# Patient Record
Sex: FEMALE | Race: WHITE | Employment: UNEMPLOYED | URBAN - METROPOLITAN AREA
[De-identification: names, ages, dates, MRNs, and addresses within clinical notes are randomized per-mention and may not be internally consistent; named-entity substitution may affect disease eponyms.]

---

## 2018-05-18 ENCOUNTER — OFFICE VISIT (OUTPATIENT)
Dept: SURGERY | Age: 58
End: 2018-05-18
Payer: COMMERCIAL

## 2018-05-18 ENCOUNTER — OFFICE VISIT (OUTPATIENT)
Dept: SURGERY | Age: 58
End: 2018-05-18

## 2018-05-18 VITALS — HEIGHT: 67 IN | RESPIRATION RATE: 16 BRPM | WEIGHT: 164 LBS | BODY MASS INDEX: 25.74 KG/M2

## 2018-05-18 DIAGNOSIS — E88.1 LIPODYSTROPHY: Primary | ICD-10-CM

## 2018-05-18 PROCEDURE — MISCPNC PLASTICS VISIT NO CHARGE: Performed by: PLASTIC SURGERY

## 2018-05-18 PROCEDURE — 99204 OFFICE O/P NEW MOD 45 MIN: CPT | Performed by: PLASTIC SURGERY

## 2018-08-08 RX ORDER — M-VIT,TX,IRON,MINS/CALC/FOLIC 27MG-0.4MG
1 TABLET ORAL DAILY
COMMUNITY

## 2018-08-15 NOTE — H&P
[address] [facility]                    [address]                               HISTORY AND PHYSICAL    PATIENT NAME: Cheko Moser                     :        1960  MED REC NO:   [id number]                            ROOM:  ACCOUNT NO:   [de-identified]                           ADMIT DATE: 2018  PROVIDER:     Chelsea Prince MD    DATE OF SERVICE:  2018. CHIEF COMPLAINT:  \"I had polyps. \"    HISTORY OF PRESENT ILLNESS:  This is a 41-year-old female who presents with  complaint of two polyps removed in 2013 in the sigmoid area and presents  for high-risk screening. The patient denies any current episodes of  bleeding, narrowed stools, pencil-like stools, and no back pain. MEDICATIONS:  Include a multivitamin. ALLERGIES:  No known drug allergies. PAST SURGICAL HISTORY:  No previous surgeries. PAST MEDICAL HISTORY:  The patient denies any hypertension, diabetes, MI,  strokes, or cancers. SOCIAL HISTORY:  The patient does not smoke. She does not drink and she  does not use any recreational drugs. REVIEW OF SYSTEMS:  The patient denies any seasonal allergies, autoimmune  problem, any skin rashes, nausea, vomiting, fevers, or chills. She denies  any ear infection, sore throat, sinus problems, wheezing, coughing, or  shortness of breath. The patient denies any chest pain, heart murmurs,  blood pressure problems, or varicose veins. She denies any abdominal pain,  nausea, vomiting, or recent changes in her bowel function. She denies any  kidney stones, infections, or frequency or urination. PHYSICAL EXAMINATION:  VITAL SIGNS:  Stable. Afebrile. GENERAL APPEARANCE:  A well-developed, well-nourished white female, in no  acute distress. HEENT:  Head is normocephalic and atraumatic. NECK:  Supple. No bruits. No adenopathy. LUNGS:  Clear to auscultation. CARDIOVASCULAR:  Regular rate and rhythm.   ABDOMEN:

## 2018-08-22 ENCOUNTER — ANESTHESIA (OUTPATIENT)
Dept: ENDOSCOPY | Age: 58
End: 2018-08-22
Payer: COMMERCIAL

## 2018-08-22 ENCOUNTER — HOSPITAL ENCOUNTER (OUTPATIENT)
Age: 58
Setting detail: OUTPATIENT SURGERY
Discharge: HOME | End: 2018-08-22
Attending: SURGERY | Admitting: SURGERY
Payer: COMMERCIAL

## 2018-08-22 ENCOUNTER — ANESTHESIA EVENT (OUTPATIENT)
Dept: ENDOSCOPY | Age: 58
End: 2018-08-22
Payer: COMMERCIAL

## 2018-08-22 VITALS
WEIGHT: 165 LBS | HEIGHT: 67 IN | SYSTOLIC BLOOD PRESSURE: 115 MMHG | TEMPERATURE: 97.8 F | RESPIRATION RATE: 16 BRPM | HEART RATE: 63 BPM | DIASTOLIC BLOOD PRESSURE: 64 MMHG | OXYGEN SATURATION: 96 % | BODY MASS INDEX: 25.9 KG/M2

## 2018-08-22 VITALS — OXYGEN SATURATION: 95 % | DIASTOLIC BLOOD PRESSURE: 60 MMHG | SYSTOLIC BLOOD PRESSURE: 122 MMHG

## 2018-08-22 PROCEDURE — 2580000003 HC RX 258: Performed by: NURSE ANESTHETIST, CERTIFIED REGISTERED

## 2018-08-22 PROCEDURE — 7100000011 HC PHASE II RECOVERY - ADDTL 15 MIN: Performed by: SURGERY

## 2018-08-22 PROCEDURE — 2580000003 HC RX 258: Performed by: SURGERY

## 2018-08-22 PROCEDURE — 3609010600 HC COLONOSCOPY POLYPECTOMY SNARE/COLD BIOPSY: Performed by: SURGERY

## 2018-08-22 PROCEDURE — 88305 TISSUE EXAM BY PATHOLOGIST: CPT

## 2018-08-22 PROCEDURE — 3700000001 HC ADD 15 MINUTES (ANESTHESIA): Performed by: SURGERY

## 2018-08-22 PROCEDURE — 2709999900 HC NON-CHARGEABLE SUPPLY: Performed by: SURGERY

## 2018-08-22 PROCEDURE — C1773 RET DEV, INSERTABLE: HCPCS | Performed by: SURGERY

## 2018-08-22 PROCEDURE — 6360000002 HC RX W HCPCS: Performed by: NURSE ANESTHETIST, CERTIFIED REGISTERED

## 2018-08-22 PROCEDURE — 7100000010 HC PHASE II RECOVERY - FIRST 15 MIN: Performed by: SURGERY

## 2018-08-22 PROCEDURE — 3700000000 HC ANESTHESIA ATTENDED CARE: Performed by: SURGERY

## 2018-08-22 RX ORDER — PROPOFOL 10 MG/ML
INJECTION, EMULSION INTRAVENOUS PRN
Status: DISCONTINUED | OUTPATIENT
Start: 2018-08-22 | End: 2018-08-22 | Stop reason: SDUPTHER

## 2018-08-22 RX ORDER — MIDAZOLAM HYDROCHLORIDE 1 MG/ML
INJECTION INTRAMUSCULAR; INTRAVENOUS PRN
Status: DISCONTINUED | OUTPATIENT
Start: 2018-08-22 | End: 2018-08-22 | Stop reason: SDUPTHER

## 2018-08-22 RX ORDER — SODIUM CHLORIDE 9 MG/ML
INJECTION, SOLUTION INTRAVENOUS CONTINUOUS
Status: DISCONTINUED | OUTPATIENT
Start: 2018-08-22 | End: 2018-08-22 | Stop reason: HOSPADM

## 2018-08-22 RX ORDER — FENTANYL CITRATE 50 UG/ML
INJECTION, SOLUTION INTRAMUSCULAR; INTRAVENOUS PRN
Status: DISCONTINUED | OUTPATIENT
Start: 2018-08-22 | End: 2018-08-22 | Stop reason: SDUPTHER

## 2018-08-22 RX ORDER — 0.9 % SODIUM CHLORIDE 0.9 %
10 VIAL (ML) INJECTION EVERY 12 HOURS SCHEDULED
Status: DISCONTINUED | OUTPATIENT
Start: 2018-08-22 | End: 2018-08-22 | Stop reason: HOSPADM

## 2018-08-22 RX ORDER — SODIUM CHLORIDE 9 MG/ML
INJECTION, SOLUTION INTRAVENOUS CONTINUOUS PRN
Status: DISCONTINUED | OUTPATIENT
Start: 2018-08-22 | End: 2018-08-22 | Stop reason: SDUPTHER

## 2018-08-22 RX ORDER — 0.9 % SODIUM CHLORIDE 0.9 %
10 VIAL (ML) INJECTION PRN
Status: DISCONTINUED | OUTPATIENT
Start: 2018-08-22 | End: 2018-08-22 | Stop reason: HOSPADM

## 2018-08-22 RX ADMIN — FENTANYL CITRATE 50 MCG: 50 INJECTION, SOLUTION INTRAMUSCULAR; INTRAVENOUS at 07:15

## 2018-08-22 RX ADMIN — MIDAZOLAM HYDROCHLORIDE 2 MG: 1 INJECTION, SOLUTION INTRAMUSCULAR; INTRAVENOUS at 07:13

## 2018-08-22 RX ADMIN — SODIUM CHLORIDE: 9 INJECTION, SOLUTION INTRAVENOUS at 07:12

## 2018-08-22 RX ADMIN — SODIUM CHLORIDE: 9 INJECTION, SOLUTION INTRAVENOUS at 07:11

## 2018-08-22 RX ADMIN — PROPOFOL 180 MG: 10 INJECTION, EMULSION INTRAVENOUS at 07:13

## 2018-08-22 RX ADMIN — FENTANYL CITRATE 50 MCG: 50 INJECTION, SOLUTION INTRAMUSCULAR; INTRAVENOUS at 07:13

## 2018-08-22 ASSESSMENT — PAIN SCALES - GENERAL
PAINLEVEL_OUTOF10: 0

## 2018-08-22 ASSESSMENT — PAIN - FUNCTIONAL ASSESSMENT: PAIN_FUNCTIONAL_ASSESSMENT: 0-10

## 2018-08-22 NOTE — ANESTHESIA PRE PROCEDURE
BP Readings from Last 3 Encounters:   08/22/18 122/71   12/28/16 121/70   03/17/16 122/82       NPO Status: Time of last liquid consumption: 2300                        Time of last solid consumption: 0830                        Date of last liquid consumption: 08/21/18                        Date of last solid food consumption: 08/21/18    BMI:   Wt Readings from Last 3 Encounters:   08/22/18 165 lb (74.8 kg)   05/18/18 164 lb (74.4 kg)   08/13/13 165 lb (74.8 kg)     Body mass index is 26.23 kg/m². CBC: No results found for: WBC, RBC, HGB, HCT, MCV, RDW, PLT    CMP: No results found for: NA, K, CL, CO2, BUN, CREATININE, GFRAA, AGRATIO, LABGLOM, GLUCOSE, PROT, CALCIUM, BILITOT, ALKPHOS, AST, ALT    POC Tests: No results for input(s): POCGLU, POCNA, POCK, POCCL, POCBUN, POCHEMO, POCHCT in the last 72 hours. Coags: No results found for: PROTIME, INR, APTT    HCG (If Applicable): No results found for: PREGTESTUR, PREGSERUM, HCG, HCGQUANT     ABGs: No results found for: PHART, PO2ART, WCM9OUA, JDY8HQJ, BEART, N0DMUNII     Type & Screen (If Applicable):  No results found for: LABABO, [address]    Anesthesia Evaluation  Patient summary reviewed  Airway: Mallampati: II  TM distance: >3 FB   Neck ROM: full  Mouth opening: > = 3 FB Dental: normal exam         Pulmonary:Negative Pulmonary ROS and normal exam                               Cardiovascular:Negative CV ROS                      Neuro/Psych:   Negative Neuro/Psych ROS              GI/Hepatic/Renal: Neg GI/Hepatic/Renal ROS            Endo/Other: Negative Endo/Other ROS                    Abdominal:           Vascular:                                        Anesthesia Plan      MAC     ASA 2       Induction: intravenous. Anesthetic plan and risks discussed with patient. Plan discussed with CRNA.                   Ryan Youngblood MD   8/22/2018

## 2018-08-22 NOTE — PROGRESS NOTES
DATE OF PROCEDURE: 8/22/2018    SURGEON: Dr. Silvino Kennedy M.D.     [facility]    PREOPERATIVE DIAGNOSES:  high risk screening, history of colon polyps; Last colonoscopy 2013*    POSTOPERATIVE DIAGNOSES: 3 polyps     OPERATION: Colonoscopy with 1 loop snare polypectomy and 2 cauterization polypectomies  ANESTHESIA: LMAC  COMPLICATIONS: None.    PLEASE SEE DICTATION      Branden Arteaga 8/22/2018 7:31 AM
surgery we would greatly appreciate your comments    [] Parent/guardian of a minor must accompany their child and remain on the premises  the entire time they are under our care     [] Pediatric patients may bring favorite toy, blanket or comfort item with them    [] A caregiver or family member must remain with the patient during their stay if they are mentally handicapped, have dementia, disoriented or unable to use a call light or would be a safety concern if left unattended    [x] Please notify surgeon if you develop any illness between now and time of surgery (cold, cough, sore throat, fever, nausea, vomiting) or any signs of infections  including skin, wounds, and dental.    [x] Other instructions    EDUCATIONAL MATERIALS PROVIDED:    [] PAT Preoperative Education Packet/Booklet     [] Medication List    [] Fluoroscopy Information Pamphlet    [] Transfusion bracelet applied with instructions    [] Joint replacement video reviewed    [] Shower with antibacterial soap and use CHG wipes provided the evening before surgery as instructed

## 2018-08-23 NOTE — OP NOTE
[address] [address]                                 OPERATIVE REPORT    PATIENT NAME: Phan Jacobson                     :        1960  MED REC NO:   [id number]                            ROOM:  ACCOUNT NO:   [de-identified]                           ADMIT DATE: 2018  PROVIDER:     Titus Wharton MD    DATE OF PROCEDURE:  2018    PREOPERATIVE DIAGNOSIS:  High-risk screening, history of colon polyps, last  colonoscopy in . POSTOPERATIVE DIAGNOSIS:  Three colon polyps. PROCEDURES:  Colonoscopy with loop snare cauterization polypectomy x1 in  the sigmoid colon and cauterization polypectomy x2 in the rectosigmoid  area. SURGEON:  Titus Wharton MD    ANESTHESIA:  LMAC. COMPLICATIONS:  None. ESTIMATED BLOOD LOSS:  None. FINDINGS:  Normal ileocecal valve and appendiceal orifice. Prep adequate. Withdrawal time six minutes. Three polyps identified and removed as stated  above. No gross evidence of colitis, tumors, masses, or malignancy. INDICATION:  High-risk screening, history of colon polyps. PROCEDURE:  The patient was brought into the endoscopy suite. An IV was  established. Permit was obtained. Informed consent was obtained. The  patient was placed in the left lateral Espinoza position and digital rectal  examination was performed. The adult video Olympus colonoscope was then inserted into the rectum which  was insufflated with air. The scope then traversed through the sigmoid,  descending, transverse, and ascending colon, all the way to a  normal-appearing ileocecal valve. A 2nd look at the mucosa was performed  as the scope was being withdrawn. Photographs were taken. The above  findings were identified. In the area of the rectum, the scope was  retroflexed.     With the air and scope completely withdrawn, the patient tolerated the  procedure well, and was transferred to the recovery room in stable  condition with stable vital signs. The findings were reviewed with the  patient and family at that time. Loop snare cauterization as well as  cauterization polypectomy performed as stated above. Deferred to  Pathology. Followup arrangements were made.         Cami Adames MD    D: 08/22/2018 7:39:31       T: 08/22/2018 11:46:51     [email or web address]  Job#: [id number]     Doc#: [id number]    CC:  Tala Jaramillo MD

## 2018-09-26 ENCOUNTER — OFFICE VISIT (OUTPATIENT)
Dept: SURGERY | Age: 58
End: 2018-09-26

## 2018-09-26 VITALS
OXYGEN SATURATION: 98 % | HEIGHT: 66 IN | DIASTOLIC BLOOD PRESSURE: 70 MMHG | BODY MASS INDEX: 26.52 KG/M2 | WEIGHT: 165 LBS | HEART RATE: 82 BPM | SYSTOLIC BLOOD PRESSURE: 112 MMHG

## 2018-09-26 DIAGNOSIS — R23.8 FACIAL AGING: Primary | ICD-10-CM

## 2018-09-26 PROCEDURE — DM00300 PR BELOTERO: Performed by: PLASTIC SURGERY

## 2018-09-26 PROCEDURE — MISCJVOL1 PR OFFICE/OUTPT VISIT,PROCEDURE ONLY: Performed by: PLASTIC SURGERY

## 2018-09-26 PROCEDURE — DM00130 PR DERM ONLY BOTOX INJ LEVEL 4: Performed by: PLASTIC SURGERY

## 2018-09-26 NOTE — PROGRESS NOTES
Botulinum Toxin Injection Procedure Note:      The risks, benefits and options were discussed with the pt. The risks included but not limited to pain, bleeding, infection, asymmetry,  and need for further procedures. The patient understands that there is a risk for upper eyelid ptosis. There may be a need for touch up injections and follow up at 2 weeks is encouraged. All of Her questions were answered to Her satisfaction and She agrees to proceed with the operation. The forehead, glabella and bilateral crows feet area was cleansed with alcohol. Ice was used to numb the area. The different FDA approved brands of botulinum toxin A were discussed with the patient, Botox was agreed upon and reconstituted in a concentration of 1 unit/ 0.01cc with sterile injectable saline. 36 units were injected into the areas. There was pinpoint bleeding and local redness. The patient tolerated the procedure well. The patient was counseled to use ice for the next hour and limit strenuous activity for 24 hours. Injectable Filler Procedure Note:      Patient states she had a 25 pound weight loss recently and noticed a significant change in the appearance of her midface. She states that her lower eyelid are protruding more fat and the cheeks are much smaller. She desires filling of the midface with Voluma as she's had in the past.  She states she did have filling in this area recently by another practitioner and thinks they used Restylane. Following the injection of Voluma, she states that she still wanted reduction in the tear trough deformity. I did discuss lower eyelid surgery with her to reduce the fat herniation or attempting Belotero injections into the tear trough as an off label use. The patient was cautioned that this can cause significant bruising and contour irregularities and that she would likely need a touchup procedure in a week.   She states she definitely would like to proceed and is willing to undertake the

## 2018-10-03 ENCOUNTER — OFFICE VISIT (OUTPATIENT)
Dept: SURGERY | Age: 58
End: 2018-10-03

## 2018-10-03 VITALS — BODY MASS INDEX: 26.52 KG/M2 | HEIGHT: 66 IN | WEIGHT: 165 LBS

## 2018-10-03 DIAGNOSIS — R23.8 FACIAL AGING: Primary | ICD-10-CM

## 2018-10-03 PROCEDURE — MISCPNC PLASTICS VISIT NO CHARGE: Performed by: PLASTIC SURGERY

## 2018-10-08 ENCOUNTER — TELEPHONE (OUTPATIENT)
Dept: SURGERY | Age: 58
End: 2018-10-08

## 2018-12-27 ENCOUNTER — OFFICE VISIT (OUTPATIENT)
Dept: SURGERY | Age: 58
End: 2018-12-27

## 2018-12-27 VITALS
WEIGHT: 165 LBS | BODY MASS INDEX: 26.52 KG/M2 | SYSTOLIC BLOOD PRESSURE: 120 MMHG | HEIGHT: 66 IN | DIASTOLIC BLOOD PRESSURE: 68 MMHG

## 2018-12-27 DIAGNOSIS — L98.9 BENIGN SKIN LESION: ICD-10-CM

## 2018-12-27 DIAGNOSIS — G89.18 POST-OP PAIN: Primary | ICD-10-CM

## 2018-12-27 PROCEDURE — MISCLL13 PR OFFICE/OUTPT VISIT,PROCEDURE ONLY: Performed by: PLASTIC SURGERY

## 2018-12-27 RX ORDER — HYDROCODONE BITARTRATE AND ACETAMINOPHEN 5; 325 MG/1; MG/1
1 TABLET ORAL EVERY 6 HOURS PRN
Qty: 10 TABLET | Refills: 0 | Status: SHIPPED | OUTPATIENT
Start: 2018-12-27 | End: 2019-01-03

## 2018-12-27 RX ORDER — LIDOCAINE HYDROCHLORIDE AND EPINEPHRINE 10; 10 MG/ML; UG/ML
3 INJECTION, SOLUTION INFILTRATION; PERINEURAL ONCE
Status: COMPLETED | OUTPATIENT
Start: 2018-12-27 | End: 2018-12-27

## 2018-12-27 RX ORDER — CLINDAMYCIN HYDROCHLORIDE 300 MG/1
300 CAPSULE ORAL 3 TIMES DAILY
Qty: 15 CAPSULE | Refills: 0 | Status: SHIPPED | OUTPATIENT
Start: 2018-12-27 | End: 2019-01-01

## 2018-12-27 RX ADMIN — LIDOCAINE HYDROCHLORIDE AND EPINEPHRINE 3 ML: 10; 10 INJECTION, SOLUTION INFILTRATION; PERINEURAL at 13:40

## 2019-01-18 ENCOUNTER — OFFICE VISIT (OUTPATIENT)
Dept: SURGERY | Age: 59
End: 2019-01-18

## 2019-01-18 VITALS — WEIGHT: 165 LBS | RESPIRATION RATE: 16 BRPM | BODY MASS INDEX: 25.9 KG/M2 | HEIGHT: 67 IN

## 2019-01-18 DIAGNOSIS — R23.8 FACIAL AGING: ICD-10-CM

## 2019-01-18 DIAGNOSIS — E88.1 LIPODYSTROPHY: Primary | ICD-10-CM

## 2019-01-18 PROCEDURE — 99024 POSTOP FOLLOW-UP VISIT: CPT | Performed by: PLASTIC SURGERY

## 2019-01-18 PROCEDURE — DM00130 PR OFFICE/OUTPT VISIT,PROCEDURE ONLY: Performed by: PLASTIC SURGERY

## 2019-02-14 ENCOUNTER — TELEPHONE (OUTPATIENT)
Dept: SURGERY | Age: 59
End: 2019-02-14

## 2019-03-08 ENCOUNTER — PROCEDURE VISIT (OUTPATIENT)
Dept: AUDIOLOGY | Age: 59
End: 2019-03-08
Payer: COMMERCIAL

## 2019-03-08 ENCOUNTER — OFFICE VISIT (OUTPATIENT)
Dept: ENT CLINIC | Age: 59
End: 2019-03-08
Payer: COMMERCIAL

## 2019-03-08 VITALS
HEIGHT: 67 IN | WEIGHT: 165 LBS | SYSTOLIC BLOOD PRESSURE: 113 MMHG | DIASTOLIC BLOOD PRESSURE: 62 MMHG | HEART RATE: 72 BPM | BODY MASS INDEX: 25.9 KG/M2 | OXYGEN SATURATION: 97 %

## 2019-03-08 DIAGNOSIS — J30.9 ALLERGIC RHINITIS, UNSPECIFIED SEASONALITY, UNSPECIFIED TRIGGER: ICD-10-CM

## 2019-03-08 DIAGNOSIS — H93.13 TINNITUS OF BOTH EARS: Primary | ICD-10-CM

## 2019-03-08 PROCEDURE — 3017F COLORECTAL CA SCREEN DOC REV: CPT | Performed by: OTOLARYNGOLOGY

## 2019-03-08 PROCEDURE — 92567 TYMPANOMETRY: CPT | Performed by: AUDIOLOGIST

## 2019-03-08 PROCEDURE — G8484 FLU IMMUNIZE NO ADMIN: HCPCS | Performed by: OTOLARYNGOLOGY

## 2019-03-08 PROCEDURE — G8419 CALC BMI OUT NRM PARAM NOF/U: HCPCS | Performed by: OTOLARYNGOLOGY

## 2019-03-08 PROCEDURE — 92557 COMPREHENSIVE HEARING TEST: CPT | Performed by: AUDIOLOGIST

## 2019-03-08 PROCEDURE — 1036F TOBACCO NON-USER: CPT | Performed by: OTOLARYNGOLOGY

## 2019-03-08 PROCEDURE — G8427 DOCREV CUR MEDS BY ELIG CLIN: HCPCS | Performed by: OTOLARYNGOLOGY

## 2019-03-08 PROCEDURE — 99204 OFFICE O/P NEW MOD 45 MIN: CPT | Performed by: OTOLARYNGOLOGY

## 2019-03-08 RX ORDER — FLUTICASONE PROPIONATE 50 MCG
2 SPRAY, SUSPENSION (ML) NASAL DAILY
Qty: 1 BOTTLE | Refills: 3 | Status: SHIPPED | OUTPATIENT
Start: 2019-03-08 | End: 2019-08-09 | Stop reason: SDUPTHER

## 2019-03-08 ASSESSMENT — ENCOUNTER SYMPTOMS
ABDOMINAL PAIN: 0
SINUS PRESSURE: 0
NAUSEA: 0
SHORTNESS OF BREATH: 0
EYE DISCHARGE: 0
EYE PAIN: 0
STRIDOR: 0
RHINORRHEA: 0
VOICE CHANGE: 0

## 2019-07-10 ENCOUNTER — OFFICE VISIT (OUTPATIENT)
Dept: SURGERY | Age: 59
End: 2019-07-10

## 2019-07-10 VITALS
DIASTOLIC BLOOD PRESSURE: 70 MMHG | WEIGHT: 164 LBS | RESPIRATION RATE: 19 BRPM | BODY MASS INDEX: 25.74 KG/M2 | HEIGHT: 67 IN | TEMPERATURE: 97.9 F | OXYGEN SATURATION: 97 % | SYSTOLIC BLOOD PRESSURE: 110 MMHG | HEART RATE: 74 BPM

## 2019-07-10 DIAGNOSIS — R23.8 FACIAL AGING: Primary | ICD-10-CM

## 2019-07-10 PROCEDURE — DM00130 PR DERM ONLY BOTOX INJ LEVEL 4: Performed by: PLASTIC SURGERY

## 2019-07-10 NOTE — PROGRESS NOTES
Botulinum Toxin Injection Procedure Note:      The risks, benefits and options were discussed with the pt. The risks included but not limited to pain, bleeding, infection, asymmetry,  and need for further procedures. The patient understands that there is a risk for upper eyelid ptosis. There may be a need for touch up injections and follow up at 2 weeks is encouraged. All of Her questions were answered to Her satisfaction and She agrees to proceed with the operation. The forehead, glabella and bilateral crows feet area was cleansed with alcohol. Ice was used to numb the area. The different FDA approved brands of botulinum toxin A were discussed with the patient, Botox was agreed upon and reconstituted in a concentration of 1 unit/ 0.01cc with sterile injectable saline. 36 units were injected into the areas. There was pinpoint bleeding and local redness. The patient tolerated the procedure well. The patient was counseled to use ice for the next hour and limit strenuous activity for 24 hours. Follow up in 2 weeks or sooner with any concerns. I attest that the patient was seen and examined by me, and concur with the documentation above. I agree with the assessment and the plan outlined. This document is generated, in part, by voice recognition software and thus  syntax and grammatical errors are possible.     Yadira Montana  2:23 PM  7/10/2019

## 2019-08-05 RX ORDER — FLUTICASONE PROPIONATE 50 MCG
2 SPRAY, SUSPENSION (ML) NASAL DAILY
Qty: 1 BOTTLE | Refills: 3 | Status: CANCELLED | OUTPATIENT
Start: 2019-08-05

## 2019-08-09 RX ORDER — FLUTICASONE PROPIONATE 50 MCG
2 SPRAY, SUSPENSION (ML) NASAL DAILY
Qty: 1 BOTTLE | Refills: 3 | Status: SHIPPED | OUTPATIENT
Start: 2019-08-09 | End: 2019-09-01 | Stop reason: ALTCHOICE

## 2019-09-01 ENCOUNTER — HOSPITAL ENCOUNTER (EMERGENCY)
Age: 59
Discharge: HOME | End: 2019-09-01
Attending: EMERGENCY MEDICINE
Payer: COMMERCIAL

## 2019-09-01 ENCOUNTER — APPOINTMENT (OUTPATIENT)
Dept: GENERAL RADIOLOGY | Age: 59
End: 2019-09-01
Payer: COMMERCIAL

## 2019-09-01 VITALS
TEMPERATURE: 98.6 F | WEIGHT: 170 LBS | BODY MASS INDEX: 26.68 KG/M2 | HEART RATE: 92 BPM | HEIGHT: 67 IN | OXYGEN SATURATION: 97 % | RESPIRATION RATE: 16 BRPM | SYSTOLIC BLOOD PRESSURE: 128 MMHG | DIASTOLIC BLOOD PRESSURE: 74 MMHG

## 2019-09-01 DIAGNOSIS — M54.9 MUSCULOSKELETAL BACK PAIN: Primary | ICD-10-CM

## 2019-09-01 LAB
ALBUMIN SERPL-MCNC: 4.6 G/DL (ref 3.5–5.2)
ALP BLD-CCNC: 97 U/L (ref 35–104)
ALT SERPL-CCNC: 16 U/L (ref 0–32)
ANION GAP SERPL CALCULATED.3IONS-SCNC: 14 MMOL/L (ref 7–16)
AST SERPL-CCNC: 20 U/L (ref 0–31)
BASOPHILS ABSOLUTE: 0.05 E9/L (ref 0–0.2)
BASOPHILS RELATIVE PERCENT: 0.5 % (ref 0–2)
BILIRUB SERPL-MCNC: 0.7 MG/DL (ref 0–1.2)
BUN BLDV-MCNC: 19 MG/DL (ref 6–20)
CALCIUM SERPL-MCNC: 9.6 MG/DL (ref 8.6–10.2)
CHLORIDE BLD-SCNC: 104 MMOL/L (ref 98–107)
CO2: 27 MMOL/L (ref 22–29)
CREAT SERPL-MCNC: 0.8 MG/DL (ref 0.5–1)
EKG ATRIAL RATE: 76 BPM
EKG P AXIS: 41 DEGREES
EKG P-R INTERVAL: 134 MS
EKG Q-T INTERVAL: 376 MS
EKG QRS DURATION: 86 MS
EKG QTC CALCULATION (BAZETT): 423 MS
EKG R AXIS: -39 DEGREES
EKG T AXIS: -18 DEGREES
EKG VENTRICULAR RATE: 76 BPM
EOSINOPHILS ABSOLUTE: 0.1 E9/L (ref 0.05–0.5)
EOSINOPHILS RELATIVE PERCENT: 1 % (ref 0–6)
GFR AFRICAN AMERICAN: >60
GFR NON-AFRICAN AMERICAN: >60 ML/MIN/1.73
GLUCOSE BLD-MCNC: 124 MG/DL (ref 74–99)
HCT VFR BLD CALC: 44.2 % (ref 34–48)
HEMOGLOBIN: 14.6 G/DL (ref 11.5–15.5)
IMMATURE GRANULOCYTES #: 0.05 E9/L
IMMATURE GRANULOCYTES %: 0.5 % (ref 0–5)
LYMPHOCYTES ABSOLUTE: 0.71 E9/L (ref 1.5–4)
LYMPHOCYTES RELATIVE PERCENT: 6.8 % (ref 20–42)
MCH RBC QN AUTO: 30.2 PG (ref 26–35)
MCHC RBC AUTO-ENTMCNC: 33 % (ref 32–34.5)
MCV RBC AUTO: 91.3 FL (ref 80–99.9)
MONOCYTES ABSOLUTE: 0.68 E9/L (ref 0.1–0.95)
MONOCYTES RELATIVE PERCENT: 6.6 % (ref 2–12)
NEUTROPHILS ABSOLUTE: 8.78 E9/L (ref 1.8–7.3)
NEUTROPHILS RELATIVE PERCENT: 84.6 % (ref 43–80)
PDW BLD-RTO: 11.9 FL (ref 11.5–15)
PLATELET # BLD: 207 E9/L (ref 130–450)
PMV BLD AUTO: 11.5 FL (ref 7–12)
POTASSIUM SERPL-SCNC: 3.7 MMOL/L (ref 3.5–5)
RBC # BLD: 4.84 E12/L (ref 3.5–5.5)
SODIUM BLD-SCNC: 145 MMOL/L (ref 132–146)
TOTAL PROTEIN: 7.6 G/DL (ref 6.4–8.3)
TROPONIN: <0.01 NG/ML (ref 0–0.03)
WBC # BLD: 10.4 E9/L (ref 4.5–11.5)

## 2019-09-01 PROCEDURE — 85025 COMPLETE CBC W/AUTO DIFF WBC: CPT

## 2019-09-01 PROCEDURE — 93010 ELECTROCARDIOGRAM REPORT: CPT | Performed by: INTERNAL MEDICINE

## 2019-09-01 PROCEDURE — 36415 COLL VENOUS BLD VENIPUNCTURE: CPT

## 2019-09-01 PROCEDURE — 71045 X-RAY EXAM CHEST 1 VIEW: CPT

## 2019-09-01 PROCEDURE — 99285 EMERGENCY DEPT VISIT HI MDM: CPT

## 2019-09-01 PROCEDURE — 93005 ELECTROCARDIOGRAM TRACING: CPT | Performed by: EMERGENCY MEDICINE

## 2019-09-01 PROCEDURE — 84484 ASSAY OF TROPONIN QUANT: CPT

## 2019-09-01 PROCEDURE — 6370000000 HC RX 637 (ALT 250 FOR IP): Performed by: EMERGENCY MEDICINE

## 2019-09-01 PROCEDURE — 80053 COMPREHEN METABOLIC PANEL: CPT

## 2019-09-01 RX ORDER — DIAZEPAM 5 MG/1
5 TABLET ORAL ONCE
Status: COMPLETED | OUTPATIENT
Start: 2019-09-01 | End: 2019-09-01

## 2019-09-01 RX ORDER — CYCLOBENZAPRINE HCL 5 MG
5 TABLET ORAL 3 TIMES DAILY PRN
Qty: 30 TABLET | Refills: 0 | Status: SHIPPED | OUTPATIENT
Start: 2019-09-01 | End: 2019-09-11

## 2019-09-01 RX ADMIN — DIAZEPAM 5 MG: 5 TABLET ORAL at 04:32

## 2019-09-01 ASSESSMENT — PAIN DESCRIPTION - LOCATION: LOCATION: BACK

## 2019-09-01 ASSESSMENT — PAIN DESCRIPTION - FREQUENCY: FREQUENCY: CONTINUOUS

## 2019-09-01 ASSESSMENT — PAIN DESCRIPTION - DESCRIPTORS: DESCRIPTORS: DULL;STABBING

## 2019-09-01 ASSESSMENT — PAIN DESCRIPTION - PAIN TYPE: TYPE: ACUTE PAIN

## 2019-09-01 ASSESSMENT — PAIN SCALES - GENERAL: PAINLEVEL_OUTOF10: 8

## 2019-09-01 NOTE — ED PROVIDER NOTES
09/01/19 0302 09/01/19 0302 09/01/19 0302 09/01/19 0302 09/01/19 0300 09/01/19 0300   128/74 98.6 °F (37 °C) Oral 102 16 97 % 5' 6.5\" (1.689 m) 170 lb (77.1 kg)      Oxygen Saturation Interpretation: Normal.    · General Appearance/Constitutional:  Alert  · HEENT:  NC/NT. PERRLA. Airway patent. · Neck:  Normal ROM. Supple. · Respiratoty:  Breath sounds: equal bilaterally. Lung sounds: normal.   · CV:  Regular rate and rhythm, normal heart sounds, without pathological murmurs, ectopy, gallops, or rubs. .  · GI:  Soft, nontender, good bowel sounds. No firm or pulsatile mass. · Integument:  Normal turgor. Warm, dry, without visible rash. · Extremities/Lymphatics:  Edema:  none Bilateral lower extremity(s). No evidence of DVT seen on physical exam..  · Neurological:  Oriented x3. Motor functions intact.     Lab / Imaging Results   (All laboratory and radiology results have been personally reviewed by myself)  Labs:  Results for orders placed or performed during the hospital encounter of 09/01/19   CBC auto differential   Result Value Ref Range    WBC 10.4 4.5 - 11.5 E9/L    RBC 4.84 3.50 - 5.50 E12/L    Hemoglobin 14.6 11.5 - 15.5 g/dL    Hematocrit 44.2 34.0 - 48.0 %    MCV 91.3 80.0 - 99.9 fL    MCH 30.2 26.0 - 35.0 pg    MCHC 33.0 32.0 - 34.5 %    RDW 11.9 11.5 - 15.0 fL    Platelets 015 121 - 112 E9/L    MPV 11.5 7.0 - 12.0 fL    Neutrophils % 84.6 (H) 43.0 - 80.0 %    Immature Granulocytes % 0.5 0.0 - 5.0 %    Lymphocytes % 6.8 (L) 20.0 - 42.0 %    Monocytes % 6.6 2.0 - 12.0 %    Eosinophils % 1.0 0.0 - 6.0 %    Basophils % 0.5 0.0 - 2.0 %    Neutrophils Absolute 8.78 (H) 1.80 - 7.30 E9/L    Immature Granulocytes # 0.05 E9/L    Lymphocytes Absolute 0.71 (L) 1.50 - 4.00 E9/L    Monocytes Absolute 0.68 0.10 - 0.95 E9/L    Eosinophils Absolute 0.10 0.05 - 0.50 E9/L    Basophils Absolute 0.05 0.00 - 0.20 E9/L   Comprehensive Metabolic Panel   Result Value Ref Range    Sodium 145 132 - 146 mmol/L

## 2019-09-05 NOTE — PROGRESS NOTES
Botox lot [id number] exp 10/21  Bacteriostatic 0.9% Sodium Chloride lot# [id number]  Exp:01/10/2020  CWY:[id number]

## 2019-10-18 ENCOUNTER — OFFICE VISIT (OUTPATIENT)
Dept: SURGERY | Age: 59
End: 2019-10-18

## 2019-10-18 DIAGNOSIS — R23.8 FACIAL AGING: Primary | ICD-10-CM

## 2019-10-18 PROCEDURE — DM00205 JUVEDERM ULTRA 1.0 - 1 SYRINGE: Performed by: PLASTIC SURGERY

## 2019-10-18 PROCEDURE — MISCJVOL1 PR OFFICE/OUTPT VISIT,PROCEDURE ONLY: Performed by: PLASTIC SURGERY

## 2019-12-05 RX ORDER — FLUTICASONE PROPIONATE 50 MCG
SPRAY, SUSPENSION (ML) NASAL
Qty: 1 BOTTLE | Refills: 3 | Status: SHIPPED | OUTPATIENT
Start: 2019-12-05

## 2020-02-28 ENCOUNTER — OFFICE VISIT (OUTPATIENT)
Dept: SURGERY | Age: 60
End: 2020-02-28

## 2020-02-28 PROCEDURE — DM00130 PR DERM ONLY BOTOX INJ LEVEL 4: Performed by: PLASTIC SURGERY

## 2020-02-28 NOTE — PROGRESS NOTES
Botox  [id number]  Exp 08 2022    Bacteriostatic 0.9% sodium chloride  Lot [id number]  Exp01 oct 2020  . Bill 47 [id number]

## 2020-05-27 ENCOUNTER — OFFICE VISIT (OUTPATIENT)
Dept: SURGERY | Age: 60
End: 2020-05-27
Payer: COMMERCIAL

## 2020-05-27 ENCOUNTER — HOSPITAL ENCOUNTER (OUTPATIENT)
Age: 60
Discharge: HOME | End: 2020-05-29
Payer: COMMERCIAL

## 2020-05-27 ENCOUNTER — OFFICE VISIT (OUTPATIENT)
Dept: SURGERY | Age: 60
End: 2020-05-27

## 2020-05-27 PROCEDURE — 3017F COLORECTAL CA SCREEN DOC REV: CPT | Performed by: PLASTIC SURGERY

## 2020-05-27 PROCEDURE — 99213 OFFICE O/P EST LOW 20 MIN: CPT | Performed by: PLASTIC SURGERY

## 2020-05-27 PROCEDURE — 11102 TANGNTL BX SKIN SINGLE LES: CPT | Performed by: PLASTIC SURGERY

## 2020-05-27 PROCEDURE — 88305 TISSUE EXAM BY PATHOLOGIST: CPT

## 2020-05-27 PROCEDURE — DM00130 PR DERM ONLY BOTOX INJ LEVEL 4: Performed by: PLASTIC SURGERY

## 2020-05-27 PROCEDURE — 1036F TOBACCO NON-USER: CPT | Performed by: PLASTIC SURGERY

## 2020-05-27 PROCEDURE — G8419 CALC BMI OUT NRM PARAM NOF/U: HCPCS | Performed by: PLASTIC SURGERY

## 2020-05-27 PROCEDURE — DM00205 PR OFFICE/OUTPT VISIT,PROCEDURE ONLY: Performed by: PLASTIC SURGERY

## 2020-05-27 PROCEDURE — G8427 DOCREV CUR MEDS BY ELIG CLIN: HCPCS | Performed by: PLASTIC SURGERY

## 2020-05-27 RX ORDER — LIDOCAINE HYDROCHLORIDE AND EPINEPHRINE 10; 10 MG/ML; UG/ML
3 INJECTION, SOLUTION INFILTRATION; PERINEURAL ONCE
Status: COMPLETED | OUTPATIENT
Start: 2020-05-27 | End: 2020-05-27

## 2020-05-27 RX ADMIN — LIDOCAINE HYDROCHLORIDE AND EPINEPHRINE 3 ML: 10; 10 INJECTION, SOLUTION INFILTRATION; PERINEURAL at 11:11

## 2020-05-27 NOTE — PROGRESS NOTES
patient was educated to keep the head elevated at least 30 degrees for the remainder of the day, and was educated to apply a cold compress to the injected areas for 15 minutes on a 15 minutes off as desired to counteract swelling. The patient was educated that bruising could last from 10 days to 2 weeks. Makeup can be applied beginning the following day. Follow up in 2 weeks or sooner with any concerns. I attest that the patient was seen and examined by me, and concur with the documentation above. I agree with the assessment and the plan outlined. This document is generated, in part, by voice recognition software and thus  syntax and grammatical errors are possible.     Chadwick Asencio  10:40 AM  5/27/2020

## 2020-06-12 ENCOUNTER — OFFICE VISIT (OUTPATIENT)
Dept: SURGERY | Age: 60
End: 2020-06-12
Payer: COMMERCIAL

## 2020-06-12 VITALS — TEMPERATURE: 98.4 F

## 2020-06-12 PROCEDURE — 1036F TOBACCO NON-USER: CPT | Performed by: PLASTIC SURGERY

## 2020-06-12 PROCEDURE — 3017F COLORECTAL CA SCREEN DOC REV: CPT | Performed by: PLASTIC SURGERY

## 2020-06-12 PROCEDURE — 99212 OFFICE O/P EST SF 10 MIN: CPT | Performed by: PLASTIC SURGERY

## 2020-06-12 PROCEDURE — G8427 DOCREV CUR MEDS BY ELIG CLIN: HCPCS | Performed by: PLASTIC SURGERY

## 2020-06-12 PROCEDURE — G8419 CALC BMI OUT NRM PARAM NOF/U: HCPCS | Performed by: PLASTIC SURGERY

## 2020-06-12 NOTE — PROGRESS NOTES
Subjective: Follow up today from a biopsy of right leg suspicious nonhealing lesion. Denies fever, nausea, vomiting, leg pain or swelling, pain is absent. The pt states that they have  kept the wound site(s) covered with bacitracin. They voice no complaints. Objective: There were no vitals taken for this visit. VITALS:  There were no vitals taken for this visit. VITALS:  Temp 98.4 °F (36.9 °C) (Temporal)   CONSTITUTIONAL:  awake, alert, cooperative, no apparent distress, and appears stated age  EYES: PERRLA, EOMI, no signs of occular infection  LUNGS:  No increased work of breathing, good air exchange  CARDIOVASCULAR:  regular rate and rhythm   EXTREMITIES: no signs of clubbing or cyanosis. MUSCULOSKELETAL: negative for flaccid muscle tone or spastic movements. NEURO: Cranial nerves II-XII grossly intact. No signs of agitated mood. SKIN: right lower leg -  10 mm x 10 mm,  red in color, irregular border, raised, no signs of bleeding,drainage or infection. Non tender to palpation.      Path:      Assessment:    Patient Active Problem List   Diagnosis    Scar       Labs: CBC:   Lab Results   Component Value Date    WBC 10.4 09/01/2019    RBC 4.84 09/01/2019    HGB 14.6 09/01/2019    HCT 44.2 09/01/2019    MCV 91.3 09/01/2019    MCH 30.2 09/01/2019    MCHC 33.0 09/01/2019    RDW 11.9 09/01/2019     09/01/2019    MPV 11.5 09/01/2019     BMP:    Lab Results   Component Value Date     09/01/2019    K 3.7 09/01/2019     09/01/2019    CO2 27 09/01/2019    BUN 19 09/01/2019    LABALBU 4.6 09/01/2019    CREATININE 0.8 09/01/2019    CALCIUM 9.6 09/01/2019    GFRAA >60 09/01/2019    LABGLOM >60 09/01/2019    GLUCOSE 124 09/01/2019         [facility]- [facility]     [address]            [address] [facility]                                                     [address], [address], [address], [address] [place] [address]              FINAL SURGICAL PATHOLOGY REPORT    NAME:            BEA PETERSON           Date of       05/27/2020                                           Collection:  Medical Record   [id number]              Date of       05/28/2020  Number:                                  Receipt:  Age:  63 Y        Sex:  F                Date          06/01/2020 11:47                                           Reported:  Date Of Birth:   1960  Financial        [id number]             Admitting     KHUSHBOO WESLEY  Number:                                  Physician:  Patient          RYAN                   Ordering      KHUSHBOO WESLEY  Location:                                Physician:    Accession Number:  [id number]                                         Additional Physicians:RAJ TATE JR      Diagnosis:  Skin lesion, right leg, shave biopsy: Basal cell carcinoma, superficial  type. The deep inked margin is positive for carcinoma, see comment. Subepithelial intradermal lichenoid chronic inflammation present.     Comment:   The deep inked margin focally transects a deposit of basal  cell, close to a lateral axis margin.                                               Shweta Peraza M.D.  [place]                                   (Electronic Signature)    Plan:     Diagnosis  -) Right lower leg basal cell carcinoma and suspicious lesion   Of left upper leg    Multiple benign skin lesions that we can laser ablate on day of surgery as well if she chooses    -The patient was counseled on their pathology results and the need for surgical   intervention.   -We will plan to proceed and have the lesion formely excised with margins in the office.  -The patient will not require frozen sections in the operating room  -The patient will not

## 2020-06-15 ENCOUNTER — TELEPHONE (OUTPATIENT)
Dept: SURGERY | Age: 60
End: 2020-06-15

## 2020-07-29 ENCOUNTER — PROCEDURE VISIT (OUTPATIENT)
Dept: SURGERY | Age: 60
End: 2020-07-29

## 2020-07-29 VITALS
TEMPERATURE: 98.9 F | RESPIRATION RATE: 20 BRPM | DIASTOLIC BLOOD PRESSURE: 62 MMHG | BODY MASS INDEX: 25.9 KG/M2 | OXYGEN SATURATION: 95 % | HEART RATE: 60 BPM | SYSTOLIC BLOOD PRESSURE: 98 MMHG | WEIGHT: 165 LBS | HEIGHT: 67 IN

## 2020-07-29 NOTE — PROGRESS NOTES
Patient presents today for the intent to excise the right lower leg superficial basal cell cancer that was previously biopsied. Patient has reservations as she is a  and significantly swells in this area and states that she would desire to swim and is unsure if she wants to have a 2-week downtime while she is healing. I reviewed the pathology with the patient and the minimal chance of aggressive development of this basal cell cancer. 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 We will reschedule her procedure for September. This document is generated, in part, by voice recognition software and thus  syntax and grammatical errors are possible.     Jones Credave  3:17 PM  7/29/2020

## 2020-09-24 ENCOUNTER — OFFICE VISIT (OUTPATIENT)
Dept: SURGERY | Age: 60
End: 2020-09-24

## 2020-09-24 ENCOUNTER — HOSPITAL ENCOUNTER (OUTPATIENT)
Age: 60
Discharge: HOME | End: 2020-09-26
Payer: COMMERCIAL

## 2020-09-24 ENCOUNTER — PROCEDURE VISIT (OUTPATIENT)
Dept: SURGERY | Age: 60
End: 2020-09-24

## 2020-09-24 VITALS
SYSTOLIC BLOOD PRESSURE: 116 MMHG | TEMPERATURE: 98 F | DIASTOLIC BLOOD PRESSURE: 74 MMHG | HEIGHT: 66 IN | OXYGEN SATURATION: 93 % | BODY MASS INDEX: 24.11 KG/M2 | WEIGHT: 150 LBS

## 2020-09-24 PROCEDURE — 88305 TISSUE EXAM BY PATHOLOGIST: CPT

## 2020-09-24 PROCEDURE — DM00130 PR DERM ONLY BOTOX INJ LEVEL 4: Performed by: PLASTIC SURGERY

## 2020-09-24 NOTE — PROGRESS NOTES
infection (redness, increasing pain, discharge, odor, fever).     The entirety of the procedure was performed by Dr. Bartolo Cabral with first assistance by Maria Alejandra Chan PA-C    Please note that the medical decision making for the patient as well as the subjective, objective, assessment and plan were reviewed and performed by Dr Miguel Ángel Thibodeaux

## 2020-09-24 NOTE — PROGRESS NOTES
Botulinum Toxin Injection Procedure Note:      The risks, benefits and options were discussed with the pt. The risks included but not limited to pain, bleeding, infection, asymmetry,  and need for further procedures. The patient understands that there is a risk for upper eyelid ptosis. There may be a need for touch up injections and follow up at 2 weeks is encouraged. All of Her questions were answered to Her satisfaction and She agrees to proceed with the operation. The forehead, glabella and bilateral crows feet area was cleansed with alcohol. Ice was used to numb the area. The different FDA approved brands of botulinum toxin A were discussed with the patient, Botox was agreed upon and reconstituted in a concentration of 1 unit/ 0.01cc with sterile injectable saline. 36 units were injected into the areas. There was pinpoint bleeding and local redness. The patient tolerated the procedure well. The patient was counseled to use ice for the next hour and limit strenuous activity for 24    Follow up in 2 weeks or sooner with any concerns. I attest that the patient was seen and examined by me, and concur with the documentation above. I agree with the assessment and the plan outlined. This document is generated, in part, by voice recognition software and thus  syntax and grammatical errors are possible.     Tricia [address]atten Street  1:08 PM  9/24/2020

## 2020-09-25 NOTE — PROGRESS NOTES
Botox AOE#[id number] exp 5/2023    Bacteriostatic 0.9% Sodium Chloride   lot#    [id number]         Expiration:  01oct2020                JOSE:[id number]

## 2020-10-01 ENCOUNTER — OFFICE VISIT (OUTPATIENT)
Dept: SURGERY | Age: 60
End: 2020-10-01

## 2020-10-01 VITALS — TEMPERATURE: 98.4 F

## 2020-10-01 PROCEDURE — 99024 POSTOP FOLLOW-UP VISIT: CPT | Performed by: PHYSICIAN ASSISTANT

## 2020-10-01 NOTE — PROGRESS NOTES
Subjective: Follow up today from excision of right lower leg basal cell carcinoma. Denies fever, nausea, vomiting, leg pain or swelling, pain is absent. The pt states that they have  kept the wound site(s) covered with bacitracin. The patient states that they have  finished their antibiotic. They state that their pain is absent. Objective:    Temp 98.4 °F (36.9 °C) (Temporal)   Breastfeeding No       Wound: Clean, dry, and intact, no drainage.   No signs of infection, wound healing well, sutures intact  Neuro- Sensation  intact to lala incisional site with light touch     Assessment:    Patient Active Problem List   Diagnosis    Scar       Labs: CBC:   Lab Results   Component Value Date    WBC 10.4 09/01/2019    RBC 4.84 09/01/2019    HGB 14.6 09/01/2019    HCT 44.2 09/01/2019    MCV 91.3 09/01/2019    MCH 30.2 09/01/2019    MCHC 33.0 09/01/2019    RDW 11.9 09/01/2019     09/01/2019    MPV 11.5 09/01/2019     BMP:    Lab Results   Component Value Date     09/01/2019    K 3.7 09/01/2019     09/01/2019    CO2 27 09/01/2019    BUN 19 09/01/2019    LABALBU 4.6 09/01/2019    CREATININE 0.8 09/01/2019    CALCIUM 9.6 09/01/2019    GFRAA >60 09/01/2019    LABGLOM >60 09/01/2019    GLUCOSE 124 09/01/2019         Pathology Report- Via Sumi Campos [address] [place] [address] 27     [address] [place]            [address] [facility]                                                   [address] [facility] [place], [address], [address] [place] [address]               FINAL SURGICAL PATHOLOGY REPORT     NAME:            BEA PETERSON           Date of       09/24/2020                                            Collection:   Medical Record   [id number]              Date of       09/25/2020 Number:                                  Receipt:   Age:  61 Y        Sex:  F                Date          09/29/2020 12:19                                            Reported:   Date Of Birth:   1960   Financial        [id number]             Admitting     KHUSHBOO WESLEY   Number:                                  Physician:   Patient          RYAN                   Ordering      KHUSHBOO WESLEY   Location:                                Physician:     Accession Number:  [id number]                                          Additional Physicians:RAJ TATE JR       Diagnosis:   Skin lesion, lower right leg, excision: Focal residual basal cell   carcinoma, completely excised. Skin scar. Plan:     Educated the pt on their pathology report. Pt voices understanding      Dressing -sutures removed today. Educate the patient on scar care  Showering at this time -okay to shower at this time. Pt was instructed on scar massage  Scar Care Instructions      Sunscreen Recommendations for Scars   We recommend that all patients use a sunscreen when outside but especially on new scars (that are exposed to sunlight) for a year after their procedure.  The SPF should be at least 28. Follow the application directions on the bottle. Why is it so Important to Use Sunscreen on Scars?  A scar is new and more fragile than the surrounding skin.  If you do not use sunscreen, the scar line will react differently to the sun than the surrounding skin.  If you don't use sunscreen, the scar tissue will become darker than surrounding skin. This is a hyper-pigmented scar and will remain darker than the other skin.  After one year, the scar and surrounding skin should react equally to sun. Superficial Scar Massage   Scar massage desensitizes and reduces scar adhesions so skin glides freely.     Rub in a circular motion on and around the scar with firm, even pressure for 5 minutes four times per day    You can start scar massage once incision is completely healed and strong enough to handle the motion (usually 10 - 14 days post operatively).  You use lotion to do the scar massage to allow ease with motion over the scar and prevent friction at the area. Patient had no further questions. F/U PRN  Call office with concerns or signs of infection.     Tiffani Eldridgema   8:46 AM  10/1/2020

## 2020-12-15 NOTE — PROGRESS NOTES
Botulinum Toxin Injection Procedure Note:      The risks, benefits and options were discussed with the pt. The risks included but not limited to pain, bleeding, infection, asymmetry,  and need for further procedures. The patient understands that there is a risk for upper eyelid ptosis. There may be a need for touch up injections and follow up at 2 weeks is encouraged. All of Her questions were answered to Her satisfaction and She agrees to proceed with the operation. The forehead, glabella and bilateral crows feet area was cleansed with alcohol. Ice was used to numb the area. The different FDA approved brands of botulinum toxin A were discussed with the patient, Botox was agreed upon and reconstituted in a concentration of 1 unit/ 0.01cc with sterile injectable saline. 36 units were injected into the areas. There was pinpoint bleeding and local redness. The patient tolerated the procedure well. The patient was counseled to use ice for the next hour and limit strenuous activity for 24    Follow up in 2 weeks or sooner with any concerns. This document is generated, in part, by voice recognition software and thus  syntax and grammatical errors are possible.     Elva Angela  1:03 PM  12/17/2020

## 2020-12-15 NOTE — PROGRESS NOTES
Subjective: Follow up today for evaluation of left anterior thigh pigmented lesion. Denies fever, nausea, vomiting, leg pain or swelling. Patient states she has noticed the lesion for several months now. She states that it is possibly increasing in size and becoming darker in pigment. She like to have this area biopsied to rule out any underlying pathology as lesion has been changing. Objective: There were no vitals taken for this visit. Left anterior thigh pigmented lesion of uncertain behavior-atypical pigmentation blue to black in color 6 mm in total diameter no pain or tenderness to palpation        Assessment:    Patient Active Problem List   Diagnosis    Scar       Plan:       Diagnosis  -) Left anterior thigh pigmented lesion of uncertain behavior    -We will plan to proceed and have the Left anterior thigh pigmented lesion of uncertain behavior punch  biopsied.  -The risks, benefits and options were discussed with the pt. The risks included but not limited to pain, bleeding, infection, heavy scarring, damage to surrounding structures, fluid collections, asymmetry, and need for further procedures. All of Her questions were answered to their satisfaction and She agrees to proceed with the procedure.  -After consent was obtained, the area was cleansed with an alcohol swab. Local anesthesia consisting of 1% lidocaine with 1:100,000 epinepherine was injected  surrounding the area. The local was allowed to work. Using a 2 mm punch biopsy the lesion was biopsied and covered with a Steri-Strip. The procedure was performed by Padmini Jewell      The patient was educated to keep the bandage in place and apply bacitracin to the wound site BID. OK to shower at this time. Advised the patient that they can allow soap and water to rinse of the wound site while showering. Once they are done in the shower they are to pat dry the incision site with a clean paper towel.   No baths, hot tubs or soaking of the wound site at this time. Pt voices understanding. I have discussed with the patient that they should make every attempt to follow-up within this time interval in the event that the pathology or radiographic findings require further attention such as surgical or other medical intervention. They voiced complete understanding. Photos obtained    The patients history, physical exam, assessment and plan were reviewed in detail with Dr. Mitzi Valentin he agrees with assessment and initial plan. Call with signs of infection or fever (Increase in pain, redness, or drainage)  Follow up 1 week      This document is generated, in part, by voice recognition software and thus  syntax and grammatical errors are possible.     Rosealee Boas  1:04 PM  12/17/2020

## 2020-12-17 ENCOUNTER — PROCEDURE VISIT (OUTPATIENT)
Dept: SURGERY | Age: 60
End: 2020-12-17
Payer: COMMERCIAL

## 2020-12-17 ENCOUNTER — OFFICE VISIT (OUTPATIENT)
Dept: SURGERY | Age: 60
End: 2020-12-17

## 2020-12-17 VITALS
DIASTOLIC BLOOD PRESSURE: 68 MMHG | TEMPERATURE: 97.4 F | SYSTOLIC BLOOD PRESSURE: 120 MMHG | OXYGEN SATURATION: 96 % | HEART RATE: 75 BPM

## 2020-12-17 DIAGNOSIS — L98.9 SKIN LESION: ICD-10-CM

## 2020-12-17 PROCEDURE — DM00130 PR DERM ONLY BOTOX INJ LEVEL 4: Performed by: PLASTIC SURGERY

## 2020-12-17 PROCEDURE — 11104 PUNCH BX SKIN SINGLE LESION: CPT | Performed by: PLASTIC SURGERY

## 2020-12-17 RX ORDER — LIDOCAINE HYDROCHLORIDE AND EPINEPHRINE 10; 10 MG/ML; UG/ML
3 INJECTION, SOLUTION INFILTRATION; PERINEURAL ONCE
Status: COMPLETED | OUTPATIENT
Start: 2020-12-17 | End: 2020-12-17

## 2020-12-17 RX ADMIN — LIDOCAINE HYDROCHLORIDE AND EPINEPHRINE 3 ML: 10; 10 INJECTION, SOLUTION INFILTRATION; PERINEURAL at 13:13

## 2020-12-17 NOTE — PROGRESS NOTES
BOTOX 36 units  LOT [id number]  EXP 08/2023    Bacteriostatic 0.9%sodium chloride  NDC 8896-1795-24  LOT [id number]  EXP 01 JUL 2021

## 2022-02-24 NOTE — PROGRESS NOTES
Injectable Filler Procedure Note:    Patient reports today for Botox and filler. She is undergoing microneedling in 3 more sessions and would like to hold off on the Botox. She would like to address the marionette lines which are substantially deep. We have agreed to use Radiesse. Patient reports today for Injectable fillers to marionette line areas on the face. The risks, benefits and options were discussed with the pt. The risks included but not limited to pain, bleeding, bruising, allergic reactions, infection, rare risk of blindness, asymmetry, and need for further procedures. All of Her questions were answered to She satisfaction and She agrees to proceed with the procedure. The area was cleansed with alcohol and the desired region of filling was marked. A dental block was not used. After cooling the skin with ice:     a 1.5cc syringe of Radiesse (agreed upon with the patient) was used with a 30 gauge needle to crosshatch the product and thereby gain filling of the soft tissues. The product did  contain xylocaine within. The injected areas were gently massaged. The patient tolerated the procedure well without complications. The patient was educated to keep the head elevated at least 30 degrees for the remainder of the day, and was educated to apply a cold compress to the injected areas for 15 minutes on a 15 minutes off as desired to counteract swelling. The patient was educated that bruising could last from 10 days to 2 weeks. Makeup can be applied beginning the following day. Follow up in 2 weeks or sooner with any concerns. ADDENDUM: Patient returned to the office within about 45 minutes. She stated there is significant swelling to the areas that were injected. I did reassure her that this is swelling. And that I have very recent believe that this will calm down within a few days. She did ask if there is anything I can inject to dissolve. I made aware that there is.   Following discussion, she and I have agreed that we will allow this to settle. If she dislikes the appearance, she can return on Monday for hyaluronidase injections at minimum      This document is generated, in part, by voice recognition software and thus  syntax and grammatical errors are possible.     Danika Stacy MD  11:48 AM  3/3/2022

## 2022-02-25 ENCOUNTER — OFFICE VISIT (OUTPATIENT)
Dept: SURGERY | Age: 62
End: 2022-02-25

## 2022-02-25 VITALS — TEMPERATURE: 97.4 F | HEART RATE: 70 BPM | OXYGEN SATURATION: 96 %

## 2022-02-25 DIAGNOSIS — R23.8 FACIAL AGING: Primary | ICD-10-CM

## 2022-02-25 PROCEDURE — DM00260: Performed by: PLASTIC SURGERY

## 2022-02-25 PROCEDURE — MISCOS15: Performed by: PLASTIC SURGERY

## 2022-08-10 LAB — MAMMOGRAPHY, EXTERNAL: NORMAL

## 2023-03-30 ENCOUNTER — TELEPHONE (OUTPATIENT)
Dept: PRIMARY CARE CLINIC | Age: 63
End: 2023-03-30

## 2023-03-30 NOTE — TELEPHONE ENCOUNTER
----- Message from Colin Gordon sent at 3/30/2023 10:37 AM EDT -----  Subject: Appointment Request    Reason for Call: New Patient/New to Provider Appointment needed: New   Patient Request Appointment    QUESTIONS    Reason for appointment request? No appointments available during search     Additional Information for Provider? Patient's son Florestine Kussmaul and wife   are current patients. Patient would like to establish PCP with Dr. Vera Kimble.  Requests callback to schedule an appointment.   ---------------------------------------------------------------------------  --------------  [id number] [employer]  [phone]; OK to leave message on voicemail  ---------------------------------------------------------------------------  --------------  SCRIPT ANSWERS  COVID Screen: [place]

## 2023-04-04 SDOH — HEALTH STABILITY: PHYSICAL HEALTH: ON AVERAGE, HOW MANY DAYS PER WEEK DO YOU ENGAGE IN MODERATE TO STRENUOUS EXERCISE (LIKE A BRISK WALK)?: 4 DAYS

## 2023-04-04 SDOH — HEALTH STABILITY: PHYSICAL HEALTH: ON AVERAGE, HOW MANY MINUTES DO YOU ENGAGE IN EXERCISE AT THIS LEVEL?: 50 MIN

## 2023-04-04 ASSESSMENT — SOCIAL DETERMINANTS OF HEALTH (SDOH)

## 2023-04-06 ENCOUNTER — OFFICE VISIT (OUTPATIENT)
Dept: PRIMARY CARE CLINIC | Age: 63
End: 2023-04-06
Payer: COMMERCIAL

## 2023-04-06 VITALS
WEIGHT: 167 LBS | SYSTOLIC BLOOD PRESSURE: 110 MMHG | DIASTOLIC BLOOD PRESSURE: 80 MMHG | HEIGHT: 67 IN | TEMPERATURE: 97.2 F | HEART RATE: 61 BPM | BODY MASS INDEX: 26.21 KG/M2 | OXYGEN SATURATION: 98 %

## 2023-04-06 DIAGNOSIS — Z00.00 ANNUAL PHYSICAL EXAM: ICD-10-CM

## 2023-04-06 DIAGNOSIS — Z00.00 ANNUAL PHYSICAL EXAM: Primary | ICD-10-CM

## 2023-04-06 LAB
ALBUMIN SERPL-MCNC: 4.5 G/DL (ref 3.5–5.2)
ALP SERPL-CCNC: 86 U/L (ref 35–104)
ALT SERPL-CCNC: 18 U/L (ref 0–32)
ANION GAP SERPL CALCULATED.3IONS-SCNC: 12 MMOL/L (ref 7–16)
AST SERPL-CCNC: 21 U/L (ref 0–31)
BILIRUB SERPL-MCNC: 0.9 MG/DL (ref 0–1.2)
BUN SERPL-MCNC: 14 MG/DL (ref 6–23)
CALCIUM SERPL-MCNC: 10.1 MG/DL (ref 8.6–10.2)
CHLORIDE SERPL-SCNC: 101 MMOL/L (ref 98–107)
CHOLESTEROL, TOTAL: 261 MG/DL (ref 0–199)
CO2 SERPL-SCNC: 28 MMOL/L (ref 22–29)
CREAT SERPL-MCNC: 0.7 MG/DL (ref 0.5–1)
ERYTHROCYTE [DISTWIDTH] IN BLOOD BY AUTOMATED COUNT: 11.9 FL (ref 11.5–15)
GLUCOSE SERPL-MCNC: 91 MG/DL (ref 74–99)
HCT VFR BLD AUTO: 45.9 % (ref 34–48)
HDLC SERPL-MCNC: 66 MG/DL
HGB BLD-MCNC: 15 G/DL (ref 11.5–15.5)
LDLC SERPL CALC-MCNC: 159 MG/DL (ref 0–99)
MCH RBC QN AUTO: 30.4 PG (ref 26–35)
MCHC RBC AUTO-ENTMCNC: 32.7 % (ref 32–34.5)
MCV RBC AUTO: 92.9 FL (ref 80–99.9)
PLATELET # BLD AUTO: 243 E9/L (ref 130–450)
PMV BLD AUTO: 12.2 FL (ref 7–12)
POTASSIUM SERPL-SCNC: 4.6 MMOL/L (ref 3.5–5)
PROT SERPL-MCNC: 7.3 G/DL (ref 6.4–8.3)
RBC # BLD AUTO: 4.94 E12/L (ref 3.5–5.5)
SODIUM SERPL-SCNC: 141 MMOL/L (ref 132–146)
TRIGL SERPL-MCNC: 179 MG/DL (ref 0–149)
VLDLC SERPL CALC-MCNC: 36 MG/DL
WBC # BLD: 7.5 E9/L (ref 4.5–11.5)

## 2023-04-06 PROCEDURE — 99386 PREV VISIT NEW AGE 40-64: CPT | Performed by: FAMILY MEDICINE

## 2023-04-06 SDOH — ECONOMIC STABILITY: HOUSING INSECURITY
IN THE LAST 12 MONTHS, WAS THERE A TIME WHEN YOU DID NOT HAVE A STEADY PLACE TO SLEEP OR SLEPT IN A SHELTER (INCLUDING NOW)?: NO

## 2023-04-06 SDOH — ECONOMIC STABILITY: FOOD INSECURITY: WITHIN THE PAST 12 MONTHS, THE FOOD YOU BOUGHT JUST DIDN'T LAST AND YOU DIDN'T HAVE MONEY TO GET MORE.: NEVER TRUE

## 2023-04-06 SDOH — ECONOMIC STABILITY: INCOME INSECURITY: HOW HARD IS IT FOR YOU TO PAY FOR THE VERY BASICS LIKE FOOD, HOUSING, MEDICAL CARE, AND HEATING?: NOT HARD AT ALL

## 2023-04-06 SDOH — ECONOMIC STABILITY: FOOD INSECURITY: WITHIN THE PAST 12 MONTHS, YOU WORRIED THAT YOUR FOOD WOULD RUN OUT BEFORE YOU GOT MONEY TO BUY MORE.: NEVER TRUE

## 2023-04-06 ASSESSMENT — PATIENT HEALTH QUESTIONNAIRE - PHQ9
SUM OF ALL RESPONSES TO PHQ9 QUESTIONS 1 & 2: 0
1. LITTLE INTEREST OR PLEASURE IN DOING THINGS: 0
SUM OF ALL RESPONSES TO PHQ QUESTIONS 1-9: 0
SUM OF ALL RESPONSES TO PHQ QUESTIONS 1-9: 0
2. FEELING DOWN, DEPRESSED OR HOPELESS: 0
SUM OF ALL RESPONSES TO PHQ QUESTIONS 1-9: 0
SUM OF ALL RESPONSES TO PHQ QUESTIONS 1-9: 0

## 2023-04-06 NOTE — PROGRESS NOTES
SUBJECTIVE:    HPI: Kevin Gross  Was seen here today for   Chief Complaint   Patient presents with    Established New Doctor     No problems check up     . She states they are dealing with no new issues    Past Medical History:   Diagnosis Date    Colon polyp     Ureter obstruction     age 12 L. Past Surgical History:   Procedure Laterality Date    COLONOSCOPY  08/13/2013    COLONOSCOPY N/A 8/22/2018    COLONOSCOPY POLYPECTOMY SNARE/COLD BIOPSY performed by Kathrine Bonilla MD at [facility] ENDOSCOPY    URETER SURGERY      OBSTRUCTION  AGE 12       Family History   Problem Relation Age of Onset    Cancer Mother 61        prostate    Cancer Father         lung    Cancer Brother         multiple myeloma    No Known Problems Brother        Prior to Admission medications    Medication Sig Start Date End Date Taking? Authorizing Provider   Multiple Vitamins-Minerals (THERAPEUTIC MULTIVITAMIN-MINERALS) tablet Take 1 tablet by mouth daily LD 8-17-18   Yes Historical Provider, MD   fluticasone (FLONASE) 50 MCG/ACT nasal spray USE 2 SPRAYS IN EACH NOSTRIL DAILY.   Patient not taking: Reported on 4/6/2023 12/5/19   Anali Freed, DO       Pcn [penicillins] and Shellfish-derived products    Social History     Tobacco Use    Smoking status: Never    Smokeless tobacco: Never   Substance Use Topics    Alcohol use: Yes     Comment: OCCASIONALLY         Review Of Systems:    Skin: no abnormal pigmentation, rash, scaling, itching, masses, hair or nail changes  Eyes: no blurring, diplopia, or eye pain  Ears/Nose/Throat: no hearing loss, tinnitus, vertigo, nosebleed, nasal congestion, rhinorrhea, sore throat  Respiratory: no cough, pleuritic chest pain, dyspnea, or wheezing  Cardiovascular: no chest pain, angina, dyspnea on exertion, orthopnea, PND, palpitations, or claudication  Gastrointestinal: no nausea, vomiting, heartburn, diarrhea, constipation, bloating,  abdominal pain, or blood per rectum  Genitourinary: no

## 2023-04-10 PROBLEM — E78.00 PURE HYPERCHOLESTEROLEMIA: Status: ACTIVE | Noted: 2023-04-10

## 2023-09-25 NOTE — H&P
H&P dictated: [id number]
well-nourished, in no acute  distress. HEENT:  Eyes:  Pupils are equal, round, and reactive. Head:   Normocephalic and atraumatic. NECK:  No bruits. No adenopathy. LUNGS:  Clear to auscultation. CARDIOVASCULAR:  Regular rate and rhythm. ABDOMEN:  Soft. No rebound, no guarding, no masses. Nondistended. Nontender. Bowel sounds are present. BACK:  No CVA tenderness. EXTREMITIES:  No clubbing, cyanosis, or edema. NEUROLOGICAL:  Cranial nerves II through XII are grossly intact. No  focal deficits noted. CLINICAL IMPRESSION:  High-risk screening, history of colon polyps, last  colonoscopy in 2018 with two polyps removed. PLAN:  Colonoscopy. Informed consent was obtained. Questions were  answered. The risks, benefits, and options were reviewed with the  patient who agrees to proceed.         Lisa Santamaria MD    D: 09/24/2023 9:56:24       T: 09/24/2023 9:58:52     [email or web address]  Job#: [id number]     Doc#: [id number]  No clinical change;  proceed with plan

## 2023-09-28 ENCOUNTER — ANESTHESIA EVENT (OUTPATIENT)
Dept: ENDOSCOPY | Age: 63
End: 2023-09-28
Payer: COMMERCIAL

## 2023-09-29 ENCOUNTER — HOSPITAL ENCOUNTER (OUTPATIENT)
Age: 63
Setting detail: OUTPATIENT SURGERY
Discharge: HOME | End: 2023-09-29
Attending: SURGERY | Admitting: SURGERY
Payer: COMMERCIAL

## 2023-09-29 ENCOUNTER — ANESTHESIA (OUTPATIENT)
Dept: ENDOSCOPY | Age: 63
End: 2023-09-29
Payer: COMMERCIAL

## 2023-09-29 VITALS
BODY MASS INDEX: 25.9 KG/M2 | OXYGEN SATURATION: 98 % | HEIGHT: 67 IN | RESPIRATION RATE: 16 BRPM | DIASTOLIC BLOOD PRESSURE: 72 MMHG | TEMPERATURE: 97.3 F | WEIGHT: 165 LBS | SYSTOLIC BLOOD PRESSURE: 109 MMHG | HEART RATE: 62 BPM

## 2023-09-29 PROCEDURE — 6360000002 HC RX W HCPCS

## 2023-09-29 PROCEDURE — 3700000001 HC ADD 15 MINUTES (ANESTHESIA): Performed by: SURGERY

## 2023-09-29 PROCEDURE — 2500000003 HC RX 250 WO HCPCS

## 2023-09-29 PROCEDURE — 2580000003 HC RX 258: Performed by: SURGERY

## 2023-09-29 PROCEDURE — 7100000011 HC PHASE II RECOVERY - ADDTL 15 MIN: Performed by: SURGERY

## 2023-09-29 PROCEDURE — 7100000010 HC PHASE II RECOVERY - FIRST 15 MIN: Performed by: SURGERY

## 2023-09-29 PROCEDURE — 3609027000 HC COLONOSCOPY: Performed by: SURGERY

## 2023-09-29 PROCEDURE — 3700000000 HC ANESTHESIA ATTENDED CARE: Performed by: SURGERY

## 2023-09-29 PROCEDURE — 2709999900 HC NON-CHARGEABLE SUPPLY: Performed by: SURGERY

## 2023-09-29 RX ORDER — SODIUM CHLORIDE 9 MG/ML
INJECTION, SOLUTION INTRAVENOUS CONTINUOUS
Status: DISCONTINUED | OUTPATIENT
Start: 2023-09-29 | End: 2023-09-29 | Stop reason: HOSPADM

## 2023-09-29 RX ORDER — SODIUM CHLORIDE 9 MG/ML
25 INJECTION, SOLUTION INTRAVENOUS PRN
Status: DISCONTINUED | OUTPATIENT
Start: 2023-09-29 | End: 2023-09-29 | Stop reason: HOSPADM

## 2023-09-29 RX ORDER — PROPOFOL 10 MG/ML
INJECTION, EMULSION INTRAVENOUS PRN
Status: DISCONTINUED | OUTPATIENT
Start: 2023-09-29 | End: 2023-09-29 | Stop reason: SDUPTHER

## 2023-09-29 RX ORDER — SODIUM CHLORIDE 0.9 % (FLUSH) 0.9 %
5-40 SYRINGE (ML) INJECTION EVERY 12 HOURS SCHEDULED
Status: DISCONTINUED | OUTPATIENT
Start: 2023-09-29 | End: 2023-09-29 | Stop reason: HOSPADM

## 2023-09-29 RX ORDER — SODIUM CHLORIDE 0.9 % (FLUSH) 0.9 %
5-40 SYRINGE (ML) INJECTION PRN
Status: DISCONTINUED | OUTPATIENT
Start: 2023-09-29 | End: 2023-09-29 | Stop reason: HOSPADM

## 2023-09-29 RX ORDER — LIDOCAINE HYDROCHLORIDE 20 MG/ML
INJECTION, SOLUTION INFILTRATION; PERINEURAL PRN
Status: DISCONTINUED | OUTPATIENT
Start: 2023-09-29 | End: 2023-09-29 | Stop reason: SDUPTHER

## 2023-09-29 RX ADMIN — SODIUM CHLORIDE: 9 INJECTION, SOLUTION INTRAVENOUS at 07:19

## 2023-09-29 RX ADMIN — LIDOCAINE HYDROCHLORIDE 60 MG: 20 INJECTION, SOLUTION INFILTRATION; PERINEURAL at 07:24

## 2023-09-29 RX ADMIN — PROPOFOL 240 MG: 10 INJECTION, EMULSION INTRAVENOUS at 07:24

## 2023-09-29 ASSESSMENT — LIFESTYLE VARIABLES: SMOKING_STATUS: 0

## 2023-09-29 ASSESSMENT — PAIN SCALES - GENERAL
PAINLEVEL_OUTOF10: 0

## 2023-09-29 NOTE — PROGRESS NOTES
Abdomen soft, non distended, normal bs x 4 quads.
procedure to notify you if your arrival time changes    [x] If you receive a survey after surgery we would greatly appreciate your comments    [x] Please notify surgeon if you develop any illness between now and time of surgery (cold, cough, sore throat, fever, nausea, vomiting) or any signs of infections  including skin, wounds, and dental.    []  The [facility]patient [facility] is available to fill your prescription here on your day of surgery, ask your preop nurse for details

## 2023-09-29 NOTE — ANESTHESIA POSTPROCEDURE EVALUATION
Department of Anesthesiology  Postprocedure Note    Patient: Hadley Goldmann  MRN: [id number]  YOB: 1960  Date of evaluation: 9/29/2023      Procedure Summary     Date: 09/29/23 Room / Location: [facility] / [facility]    Anesthesia Start: 2923 Anesthesia Stop: 8132    Procedure: COLORECTAL CANCER SCREENING, HIGH RISK Diagnosis:       History of colon polyps      (History of colon polyps [Z86.010])    Surgeons: Shavon Alex MD Responsible Provider: Raven John MD    Anesthesia Type: MAC ASA Status: 2          Anesthesia Type: No value filed.     Thang Phase I:      Thang Phase II:        Anesthesia Post Evaluation    Patient location during evaluation: bedside  Patient participation: complete - patient participated  Level of consciousness: awake and alert  Airway patency: patent  Nausea & Vomiting: no nausea and no vomiting  Complications: no  Cardiovascular status: blood pressure returned to baseline  Respiratory status: acceptable  Hydration status: euvolemic

## 2023-09-29 NOTE — OP NOTE
[address]                  [address]                                OPERATIVE REPORT    PATIENT NAME: Efe Hernandez                     :        1960  MED REC NO:   [id number]                            ROOM:  ACCOUNT NO:   [de-identified]                           ADMIT DATE: 2023  PROVIDER:     Jessica Meredith MD    DATE OF PROCEDURE:  2023    PREOPERATIVE DIAGNOSES:  Colorectal cancer screening, history of colon  polyp. POSTOPERATIVE DIAGNOSES:  Diverticulosis. No recurrent polyps. PROCEDURE PERFORMED:  Colonoscopy. SURGEON:  Jessica Meredith MD.    ANESTHESIA:  LMAC. COMPLICATIONS:  None. ESTIMATED BLOOD LOSS:  None. SPECIMENS:  None. FINDINGS:  Normal ileocecal valve and appendiceal orifice identified. No gross evidence of recurrent polyps. Left-sided diverticulosis noted  without complicating features. There was no gross evidence of colitis,  tumors, masses, polyps, or malignancies. Small internal hemorrhoids  were visualized upon retroflexion in the rectum. Multiple photographs  obtained. BOSTON BOWEL PREP SCORE:  7, right side 2, transverse colon 3, left  colon 2. WITHDRAWAL TIME:  8 minutes. INDICATIONS:  Colorectal cancer screening, history of colon polyp. DESCRIPTION OF PROCEDURE:  The patient was brought into the endoscopy  suite. An IV was established. Permit was obtained. Informed consent  was obtained. The patient was placed in the left lateral Espinoza position  and digital rectal examination was performed. The adult video Olympus colonoscope was then inserted into the rectum  which was insufflated with air. The scope then traversed through the  sigmoid, descending, transverse, and ascending colon, all the way to a  normal-appearing ileocecal valve. A 2nd look at the mucosa was  performed as the scope was being withdrawn. Photographs were taken.    The above findings

## 2023-09-29 NOTE — BRIEF OP NOTE
DATE OF PROCEDURE: 9/29/2023    SURGEON: Dr. Caty Borden MD M.D.     [place]: none    PREOPERATIVE DIAGNOSES:  colorectal cancer screening, history of colon polyps    POSTOPERATIVE DIAGNOSES: diverticulosis, no recurrent polyps     OPERATION: Colonoscopy   ANESTHESIA: LMAC  EBL: none  Specimen: none  COMPLICATIONS: None. FINDINGS: Normal ileocecal valve and appendiceal orifice identified. No gross evidence of recurrent polyps. Left-sided diverticulosis noted without complicating features. There is no gross evidence of colitis, tumors, polyps, masses, ulcers, or malignancies. Small internal hemorrhoids were visualized upon retroflexion in the rectum. Multiple photographs obtained.     Please see dictation  Caty Borden MD 9/29/2023 7:44 AM

## 2023-09-29 NOTE — DISCHARGE INSTRUCTIONS
Post endoscopy home instructions: You have had: Colonoscopy    Restrictions:  Do not drive car or operate heavy machinery for the remainder of the day. Diet: regular    If you have had a polyp removed - do not take aspirin or ibuprofen products for: 5 days. Treatment for Common After Effects:  Sore throat- Treat with throat lozenges and/or gargle with warm salt water. Mild abdominal pain, cramping, bloating, or excessive gas- rest and eat lightly. Symptoms to watch for and report to your physician:  Chest pain, vomiting, difficult breathing, severe abdominal pain, large amount of rectal bleeding, chills or fever within 24 hours after you procedure.     Follow up care:  Call the office to make an appointment for office visit in :as needed

## 2024-02-26 NOTE — PROGRESS NOTES
Botulinum Toxin Injection Procedure Note:      The risks, benefits and options were discussed with the pt. The risks included but not limited to pain, bleeding, infection, asymmetry,  and need for further procedures. The patient understands that there is a risk for upper eyelid ptosis. There may be a need for touch up injections and follow up at 2 weeks is encouraged. All of Her questions were answered to Her satisfaction and She agrees to proceed with the operation.    The forehead, glabella and bilateral crows feet area was cleansed with alcohol. Ice was used to numb the area. The different FDA approved brands of botulinum toxin A were discussed with the patient, Botox was agreed upon and reconstituted in a concentration of 1 unit/ 0.01cc with sterile injectable saline.  40 units were injected into the areas. There was pinpoint bleeding and local redness. The patient tolerated the procedure well.    The patient was counseled to use ice for the next hour and limit strenuous activity for 24    Follow up in 2 weeks or sooner with any concerns.        This document is generated, in part, by voice recognition software and thus  syntax and grammatical errors are possible.    Cayden Street MD  11:07 AM  3/1/2024

## 2024-03-01 ENCOUNTER — OFFICE VISIT (OUTPATIENT)
Dept: SURGERY | Age: 64
End: 2024-03-01

## 2024-03-01 VITALS — TEMPERATURE: 97.5 F

## 2024-03-01 DIAGNOSIS — R23.8 FACIAL AGING: Primary | ICD-10-CM

## 2024-03-01 NOTE — PROGRESS NOTES
Bacteriostatic 0.9% Sodium Chloride  LOT#: [id number]  Exp: 1 APR 2025  NDC: 7736-4430-21    Botox  Lot #: [id number]  Exp: 2026/03

## 2024-03-22 ENCOUNTER — OFFICE VISIT (OUTPATIENT)
Dept: SURGERY | Age: 64
End: 2024-03-22

## 2024-03-22 VITALS — TEMPERATURE: 97.2 F

## 2024-03-22 DIAGNOSIS — R23.8 FACIAL AGING: Primary | ICD-10-CM

## 2024-03-22 PROCEDURE — DM00205 PR OFFICE/OUTPT VISIT,PROCEDURE ONLY: Performed by: PLASTIC SURGERY

## 2024-04-05 ASSESSMENT — PATIENT HEALTH QUESTIONNAIRE - PHQ9
2. FEELING DOWN, DEPRESSED OR HOPELESS: NOT AT ALL
SUM OF ALL RESPONSES TO PHQ QUESTIONS 1-9: 0
SUM OF ALL RESPONSES TO PHQ9 QUESTIONS 1 & 2: 0
1. LITTLE INTEREST OR PLEASURE IN DOING THINGS: NOT AT ALL
SUM OF ALL RESPONSES TO PHQ QUESTIONS 1-9: 0
SUM OF ALL RESPONSES TO PHQ QUESTIONS 1-9: 0
2. FEELING DOWN, DEPRESSED OR HOPELESS: NOT AT ALL
SUM OF ALL RESPONSES TO PHQ9 QUESTIONS 1 & 2: 0
1. LITTLE INTEREST OR PLEASURE IN DOING THINGS: NOT AT ALL
SUM OF ALL RESPONSES TO PHQ QUESTIONS 1-9: 0

## 2024-04-08 ENCOUNTER — OFFICE VISIT (OUTPATIENT)
Dept: PRIMARY CARE CLINIC | Age: 64
End: 2024-04-08
Payer: COMMERCIAL

## 2024-04-08 VITALS
TEMPERATURE: 97.5 F | OXYGEN SATURATION: 97 % | WEIGHT: 172 LBS | DIASTOLIC BLOOD PRESSURE: 76 MMHG | HEIGHT: 67 IN | RESPIRATION RATE: 16 BRPM | HEART RATE: 71 BPM | SYSTOLIC BLOOD PRESSURE: 120 MMHG | BODY MASS INDEX: 27 KG/M2

## 2024-04-08 DIAGNOSIS — Z00.00 ANNUAL PHYSICAL EXAM: Primary | ICD-10-CM

## 2024-04-08 LAB
ALBUMIN SERPL-MCNC: 4.4 G/DL (ref 3.5–5.2)
ALP BLD-CCNC: 92 U/L (ref 35–104)
ALT SERPL-CCNC: 18 U/L (ref 0–32)
ANION GAP SERPL CALCULATED.3IONS-SCNC: 12 MMOL/L (ref 7–16)
AST SERPL-CCNC: 20 U/L (ref 0–31)
BILIRUB SERPL-MCNC: 0.7 MG/DL (ref 0–1.2)
BUN BLDV-MCNC: 18 MG/DL (ref 6–23)
CALCIUM SERPL-MCNC: 9.7 MG/DL (ref 8.6–10.2)
CHLORIDE BLD-SCNC: 105 MMOL/L (ref 98–107)
CHOLESTEROL: 230 MG/DL
CO2: 25 MMOL/L (ref 22–29)
CREAT SERPL-MCNC: 0.8 MG/DL (ref 0.5–1)
GFR SERPL CREATININE-BSD FRML MDRD: 89 ML/MIN/1.73M2
GLUCOSE BLD-MCNC: 97 MG/DL (ref 74–99)
HCT VFR BLD CALC: 45.6 % (ref 34–48)
HDLC SERPL-MCNC: 62 MG/DL
HEMOGLOBIN: 15 G/DL (ref 11.5–15.5)
LDL CHOLESTEROL: 148 MG/DL
MCH RBC QN AUTO: 30.7 PG (ref 26–35)
MCHC RBC AUTO-ENTMCNC: 32.9 G/DL (ref 32–34.5)
MCV RBC AUTO: 93.4 FL (ref 80–99.9)
PDW BLD-RTO: 12.1 % (ref 11.5–15)
PLATELET # BLD: 226 K/UL (ref 130–450)
PMV BLD AUTO: 12.5 FL (ref 7–12)
POTASSIUM SERPL-SCNC: 5.5 MMOL/L (ref 3.5–5)
RBC # BLD: 4.88 M/UL (ref 3.5–5.5)
SODIUM BLD-SCNC: 142 MMOL/L (ref 132–146)
TOTAL PROTEIN: 7.3 G/DL (ref 6.4–8.3)
TRIGL SERPL-MCNC: 102 MG/DL
VLDLC SERPL CALC-MCNC: 20 MG/DL
WBC # BLD: 5.5 K/UL (ref 4.5–11.5)

## 2024-04-08 PROCEDURE — 99396 PREV VISIT EST AGE 40-64: CPT | Performed by: FAMILY MEDICINE

## 2024-04-08 SDOH — ECONOMIC STABILITY: FOOD INSECURITY: WITHIN THE PAST 12 MONTHS, YOU WORRIED THAT YOUR FOOD WOULD RUN OUT BEFORE YOU GOT MONEY TO BUY MORE.: NEVER TRUE

## 2024-04-08 SDOH — ECONOMIC STABILITY: FOOD INSECURITY: WITHIN THE PAST 12 MONTHS, THE FOOD YOU BOUGHT JUST DIDN'T LAST AND YOU DIDN'T HAVE MONEY TO GET MORE.: NEVER TRUE

## 2024-04-08 SDOH — ECONOMIC STABILITY: INCOME INSECURITY: HOW HARD IS IT FOR YOU TO PAY FOR THE VERY BASICS LIKE FOOD, HOUSING, MEDICAL CARE, AND HEATING?: NOT HARD AT ALL

## 2024-04-08 NOTE — PROGRESS NOTES
SUBJECTIVE:    HPI: Dayanara Alvarado  Was seen here today for   Chief Complaint   Patient presents with    Annual Exam    .  She states they are dealing with no new issues.     Past Medical History:   Diagnosis Date    Colon polyp     Diverticulosis 09/29/2023    Pure hypercholesterolemia 04/10/2023    Ureter obstruction     age 16 L.       Past Surgical History:   Procedure Laterality Date    COLONOSCOPY  08/13/2013    COLONOSCOPY N/A 8/22/2018    COLONOSCOPY POLYPECTOMY SNARE/COLD BIOPSY performed by Domingo Madrid MD at [facility] ENDOSCOPY    COLONOSCOPY N/A 9/29/2023    COLORECTAL CANCER SCREENING, HIGH RISK performed by Domingo Madrid MD at [facility] ENDOSCOPY    URETER SURGERY      OBSTRUCTION  AGE 16       Family History   Problem Relation Age of Onset    High Cholesterol Mother     Cancer Mother 59        prostate    Cancer Father         lung    High Cholesterol Brother     Cancer Brother         multiple myeloma    No Known Problems Brother        Prior to Admission medications    Medication Sig Start Date End Date Taking? Authorizing Provider   Multiple Vitamins-Minerals (THERAPEUTIC MULTIVITAMIN-MINERALS) tablet Take 1 tablet by mouth daily LD 8-17-18   Yes Provider, Ray, MD       Pcn [penicillins] and Shellfish-derived products    Social History     Tobacco Use    Smoking status: Never    Smokeless tobacco: Never   Substance Use Topics    Alcohol use: Yes     Comment: OCCASIONALLY         Review Of Systems:    Skin: no abnormal pigmentation, rash, scaling, itching, masses, hair or nail changes  Eyes: no blurring, diplopia, or eye pain  Ears/Nose/Throat: no hearing loss, tinnitus, vertigo, nosebleed, nasal congestion, rhinorrhea, sore throat  Respiratory: no cough, pleuritic chest pain, dyspnea, or wheezing  Cardiovascular: no chest pain, angina, dyspnea on exertion, orthopnea, PND, palpitations, or claudication  Gastrointestinal: no nausea, vomiting, heartburn, diarrhea, constipation, bloating,  abdominal

## 2024-04-09 ENCOUNTER — TELEPHONE (OUTPATIENT)
Dept: PRIMARY CARE CLINIC | Age: 64
End: 2024-04-09

## 2024-04-09 NOTE — TELEPHONE ENCOUNTER
Left message patient's cholesterol improved by 30 points from 1 year ago.  She has a 4.1% risk of ASCVD which is good.  However still would like her cholesterol at goal.  Her cholesterol is 238.  Bad cholesterol 148 would like to get that under 100.  If she agrees to statin after her calcium coronary CT result we can start her on this.

## 2024-05-20 ENCOUNTER — HOSPITAL ENCOUNTER (OUTPATIENT)
Dept: RADIOLOGY | Facility: HOSPITAL | Age: 64
End: 2024-05-20
Payer: COMMERCIAL

## 2024-05-21 ENCOUNTER — APPOINTMENT (OUTPATIENT)
Dept: RADIOLOGY | Facility: HOSPITAL | Age: 64
End: 2024-05-21

## 2024-05-29 ENCOUNTER — OFFICE VISIT (OUTPATIENT)
Dept: SURGERY | Age: 64
End: 2024-05-29

## 2024-05-29 VITALS — TEMPERATURE: 97.2 F

## 2024-05-29 DIAGNOSIS — R23.8 FACIAL AGING: Primary | ICD-10-CM

## 2024-05-29 PROCEDURE — DM00130 PR OFFICE/OUTPT VISIT,PROCEDURE ONLY: Performed by: PLASTIC SURGERY

## 2024-05-29 NOTE — PROGRESS NOTES
Botulinum Toxin Injection Procedure Note:      The risks, benefits and options were discussed with the pt. The risks included but not limited to pain, bleeding, infection, asymmetry,  and need for further procedures. The patient understands that there is a risk for upper eyelid ptosis. There may be a need for touch up injections and follow up at 2 weeks is encouraged. All of Her questions were answered to Her satisfaction and She agrees to proceed with the operation.    The forehead, glabella, crows feet, upper lip was cleansed with alcohol. Ice was used to numb the area. The different FDA approved brands of botulinum toxin A were discussed with the patient, Botox was agreed upon and reconstituted in a concentration of 1 unit/ 0.01cc with sterile injectable saline.  40 units were injected into the areas. There was pinpoint bleeding and local redness. The patient tolerated the procedure well.    The patient was counseled to use ice for the next hour and limit strenuous activity for 24 hours.    Follow up in 2 weeks for check or 3 months for re-injection.      Patient is interested in fillers in early July.  Will plan on doing 2 syringes of Voluma, 2 syringes of Juvéderm ultra, and 1 syringe of Belotero to the radial lip lines        This document is generated, in part, by voice recognition software and thus  syntax and grammatical errors are possible.    Cayden Street MD  2:37 PM  5/29/2024

## 2024-06-12 ENCOUNTER — APPOINTMENT (OUTPATIENT)
Dept: RADIOLOGY | Facility: HOSPITAL | Age: 64
End: 2024-06-12

## 2024-07-01 NOTE — PROGRESS NOTES
Injectable Filler Procedure Note:    Patient reports today for Injectable fillers to nasolabial folds and marionette line areas on the face. The risks, benefits and options were discussed with the pt. The risks included but not limited to pain, bleeding, bruising, allergic reactions, infection, rare risk of blindness, asymmetry, and need for further procedures. All of Her questions were answered to She satisfaction and She agrees to proceed with the procedure.    The area was cleansed with alcohol and the desired region of filling was marked. A dental block was not used. After cooling the skin with ice:     Two 1cc syringe of Juvederm XC (agreed upon with the patient) was used with a 30 gauge needle to crosshatch the product and thereby gain filling of the soft tissues. The product did  contain xylocaine within.     The injected areas were gently massaged. The patient tolerated the procedure well without complications.    The patient was educated to keep the head elevated at least 30 degrees for the remainder of the day, and was educated to apply a cold compress to the injected areas for 15 minutes on a 15 minutes off as desired to counteract swelling. The patient was educated that bruising could last from 10 days to 2 weeks. Makeup can be applied beginning the following day.     Follow up in 2 weeks or sooner with any concerns.      Botulinum Toxin Injection Procedure Note:      The risks, benefits and options were discussed with the pt. The risks included but not limited to pain, bleeding, infection, asymmetry,  and need for further procedures. The patient understands that there is a risk for upper eyelid ptosis. There may be a need for touch up injections and follow up at 2 weeks is encouraged. All of Her questions were answered to Her satisfaction and She agrees to proceed with the operation.    The upper lip was cleansed with alcohol. Ice was used to numb the area. The different FDA approved brands of botulinum

## 2024-07-02 ENCOUNTER — HOSPITAL ENCOUNTER (OUTPATIENT)
Dept: RADIOLOGY | Facility: CLINIC | Age: 64
Discharge: HOME | End: 2024-07-02
Payer: COMMERCIAL

## 2024-07-02 DIAGNOSIS — Z13.9 ENCOUNTER FOR SCREENING, UNSPECIFIED: ICD-10-CM

## 2024-07-02 DIAGNOSIS — R07.89 OTHER CHEST PAIN: ICD-10-CM

## 2024-07-02 PROCEDURE — 75571 CT HRT W/O DYE W/CA TEST: CPT

## 2024-07-05 ENCOUNTER — HOSPITAL ENCOUNTER (OUTPATIENT)
Dept: RADIOLOGY | Facility: HOSPITAL | Age: 64
Discharge: HOME | End: 2024-07-05
Payer: COMMERCIAL

## 2024-07-05 DIAGNOSIS — Z80.3 FAMILY HISTORY OF MALIGNANT NEOPLASM OF BREAST: ICD-10-CM

## 2024-07-05 PROCEDURE — 6100000003 BI MR BREAST BILATERAL WITH CONTRAST FAST SCREENING SELF PAY

## 2024-07-05 PROCEDURE — 2550000001 HC RX 255 CONTRASTS

## 2024-07-05 PROCEDURE — A9575 INJ GADOTERATE MEGLUMI 0.1ML: HCPCS

## 2024-07-05 RX ORDER — GADOTERATE MEGLUMINE 376.9 MG/ML
15 INJECTION INTRAVENOUS
Status: COMPLETED | OUTPATIENT
Start: 2024-07-05 | End: 2024-07-05

## 2024-07-10 ENCOUNTER — OFFICE VISIT (OUTPATIENT)
Dept: SURGERY | Age: 64
End: 2024-07-10

## 2024-07-10 VITALS — TEMPERATURE: 97.7 F

## 2024-07-10 DIAGNOSIS — R23.8 FACIAL AGING: Primary | ICD-10-CM

## 2024-07-10 PROCEDURE — DM00205 JUVEDERM ULTRA 1.0 - 1 SYRINGE: Performed by: PLASTIC SURGERY

## 2024-07-10 PROCEDURE — DM00130 PR OFFICE/OUTPT VISIT,PROCEDURE ONLY: Performed by: PLASTIC SURGERY

## 2024-07-11 NOTE — PROGRESS NOTES
Botox 4 units/Cosmetic  Lot: [id number]  Exp: 2026/04    Bacteriostatic 0.9% Sodium Chloride  Lot: [id number]  Exp: 1APR 2025  NDC: 9321-3011-28    Juvederm Ultra x 2 syringes  GTIN: [id number]  Lot: [id number]  Exp: 2024-11-02

## 2024-07-12 ENCOUNTER — TELEPHONE (OUTPATIENT)
Dept: PRIMARY CARE CLINIC | Age: 64
End: 2024-07-12

## 2024-07-12 NOTE — TELEPHONE ENCOUNTER
Pt called in stating that she has been having this pain in her upper stomach area. She asked what was up there. I told her that it could your gallbladder, liver, stomach, esophagus, and more.     She stated when she is up, sitting, walking and more that it is very painful. She states she that she had a sinus infection earlier in the week and has not been feeling good since.     She said that she had gotten a ct cardiac scoring test done and they saw a very small hiatal hernia and asked if that could be causing the pain. I said it could be, but I can send a message back to Dr Johnston and she said okay.    I do offer a walk in care, but she wanted me to send a message to you.     Please advise.

## 2024-07-15 NOTE — TELEPHONE ENCOUNTER
Spoke with patient on July 12.  Told her to try omeprazole for this abdominal pain that she is having.  If no improvement go to the emergency room or see me.

## 2024-08-22 ENCOUNTER — OFFICE VISIT (OUTPATIENT)
Dept: SURGERY | Age: 64
End: 2024-08-22

## 2024-08-22 VITALS — TEMPERATURE: 97.3 F

## 2024-08-22 DIAGNOSIS — R23.8 FACIAL AGING: Primary | ICD-10-CM

## 2024-08-22 PROCEDURE — DM00150 PR DERM ONLY BOTOX INJ LEVEL 6: Performed by: PLASTIC SURGERY

## 2024-08-22 NOTE — PROGRESS NOTES
Botox 40 units/cosmetic  Lot: [id number]  Exp: 2026/06    Bacteriostatic 0.9% Sodium Chloride  Lot: [id number]  Exp: 1 APR 2025  NDC: 8970-1377-33

## 2024-08-26 NOTE — PROGRESS NOTES
Botulinum Toxin Injection Procedure Note:      The risks, benefits and options were discussed with the pt. The risks included but not limited to pain, bleeding, infection, asymmetry,  and need for further procedures. The patient understands that there is a risk for upper eyelid ptosis. There may be a need for touch up injections and follow up at 2 weeks is encouraged. All of Her questions were answered to Her satisfaction and She agrees to proceed with the operation.    The forehead, glabella, crows feet, upper lip was cleansed with alcohol. Ice was used to numb the area. The different FDA approved brands of botulinum toxin A were discussed with the patient, Botox was agreed upon and reconstituted in a concentration of 1 unit/ 0.01cc with sterile injectable saline.  40 units were injected into the areas. There was pinpoint bleeding and local redness. The patient tolerated the procedure well.    The patient was counseled to use ice for the next hour and limit strenuous activity for 24 hours.    Follow up in 2 weeks for check or 3 months for re-injection.      Patient is interested in fillers in early July.  Will plan on doing 2 syringes of Voluma, 2 syringes of Juvéderm ultra, and 1 syringe of Belotero to the radial lip lines        This document is generated, in part, by voice recognition software and thus  syntax and grammatical errors are possible.    Cayden Street MD  10:33 AM  8/26/2024

## 2024-11-15 ENCOUNTER — OFFICE VISIT (OUTPATIENT)
Dept: SURGERY | Age: 64
End: 2024-11-15

## 2024-11-15 VITALS
TEMPERATURE: 97.2 F | DIASTOLIC BLOOD PRESSURE: 72 MMHG | RESPIRATION RATE: 20 BRPM | SYSTOLIC BLOOD PRESSURE: 130 MMHG | HEART RATE: 75 BPM | OXYGEN SATURATION: 98 %

## 2024-11-15 DIAGNOSIS — R23.8 FACIAL AGING: Primary | ICD-10-CM

## 2024-11-15 NOTE — PROGRESS NOTES
Botulinum Toxin Injection Procedure Note:      The risks, benefits and options were discussed with the pt. The risks included but not limited to pain, bleeding, infection, asymmetry,  and need for further procedures. The patient understands that there is a risk for upper eyelid ptosis. There may be a need for touch up injections and follow up at 2 weeks is encouraged. All of Her questions were answered to Her satisfaction and She agrees to proceed with the operation.    The forehead, glabella, crows feet, upper lip was cleansed with alcohol. Ice was used to numb the area. The different FDA approved brands of botulinum toxin A were discussed with the patient, Botox was agreed upon and reconstituted in a concentration of 1 unit/ 0.01cc with sterile injectable saline.  40 units were injected into the areas. There was pinpoint bleeding and local redness. The patient tolerated the procedure well.    The patient was counseled to use ice for the next hour and limit strenuous activity for 24 hours.    Follow up in 2 weeks for check or 3 months for re-injection.      Patient I also had a long discussion about erbium YAG laser both Contour TRL and combo pill.  Patient would like to maintain her care in the office if possible and is very interested in a combination pill to improve skin texture and color.  She understands that this is not a replacement for facelift or deeper Contour TRL.  She understands she may need several treatments to reach her goals.    We have also discussed liposuction to the abdomen and flank medial lateral thigh and knees.    Patient states she works out in diet as much she can however has these particular problem areas.  Patient is hesitant to go on any GLP-1 weight loss medications.  On exam: The patient has graspable fat of the abdomen as well as bilateral flank.  She does have a pre-existing left flank scar from a kidney surgery in the past.  Medial and outer thigh adiposity as well as superior

## 2024-11-15 NOTE — PROGRESS NOTES
Botox 40 units/Cosmetic  Lot: [id number]  Exp: 2026/09    Bacteriostatic 0.9% Sodium Chloride  Lot: [id number]  Exp: 1 APR 2025  NDC: 7751-0042-03

## 2024-11-21 ENCOUNTER — TELEPHONE (OUTPATIENT)
Dept: SURGERY | Age: 64
End: 2024-11-21

## 2024-11-21 NOTE — TELEPHONE ENCOUNTER
Patient seen on 11/15/24 and received cosmetic quotes for mlp/profractional and liposuction abdomen and liposuction flanks, extensive, in addition patient wanted quote for liposuction inner and outer thighs, extensive and liposuction for bilateral knee extensive.  On 11/21/2024 office spoke with patient regarding last quote need for thighs and knees, patient is aware of cost. Patient asked if coolsculpting would be a better direction for her to go. Patient is scheduled 2/12/2024 for botox and consult for coolsculpting. And in march for mlp/prof to full face. Unpaid,unsigned quote for liposuction inner/outer thighs and bilateral knees scanned to chart.

## 2025-01-21 NOTE — PROGRESS NOTES
Department of Plastic Surgery - Adult  Attending Consult Note        CHIEF COMPLAINT:  Unwanted fat of abdomen    History Obtained From:  patient    HISTORY OF PRESENT ILLNESS:                The patient is a 64 y.o. female who presents with unwanted fat of abdomen.  The patient states that they are currently involved in a diet and exercise plan to optimize their fat distribution and have the above areas of concern.  She also presents today for continued Botox injections.    Past Medical History:    Past Medical History:   Diagnosis Date    Colon polyp     Diverticulosis 09/29/2023    Pure hypercholesterolemia 04/10/2023    Ureter obstruction     age 16 L.     Past Surgical History:    Past Surgical History:   Procedure Laterality Date    COLONOSCOPY  08/13/2013    COLONOSCOPY N/A 8/22/2018    COLONOSCOPY POLYPECTOMY SNARE/COLD BIOPSY performed by Domingo Madrid MD at [facility] ENDOSCOPY    COLONOSCOPY N/A 9/29/2023    COLORECTAL CANCER SCREENING, HIGH RISK performed by Domingo Madrid MD at [facility] ENDOSCOPY    URETER SURGERY      OBSTRUCTION  AGE 16     Current Medications:   No current facility-administered medications for this visit.  Allergies:  Pcn [penicillins] and Shellfish-derived products    Social History:   Social History     Socioeconomic History    Marital status:      Spouse name: Not on file    Number of children: 3    Years of education: Not on file    Highest education level: Not on file   Occupational History    Occupation:    Tobacco Use    Smoking status: Never    Smokeless tobacco: Never   Vaping Use    Vaping status: Never Used   Substance and Sexual Activity    Alcohol use: Yes     Comment: OCCASIONALLY    Drug use: No    Sexual activity: Defer   Other Topics Concern    Not on file   Social History Narrative    Not on file     Social Determinants of Health     Financial Resource Strain: Low Risk  (4/8/2024)    Overall Financial Resource Strain (CARDIA)     Difficulty of

## 2025-02-12 ENCOUNTER — OFFICE VISIT (OUTPATIENT)
Dept: SURGERY | Age: 65
End: 2025-02-12

## 2025-02-12 VITALS
OXYGEN SATURATION: 94 % | SYSTOLIC BLOOD PRESSURE: 104 MMHG | DIASTOLIC BLOOD PRESSURE: 66 MMHG | RESPIRATION RATE: 20 BRPM | HEART RATE: 73 BPM | TEMPERATURE: 97.5 F

## 2025-02-12 DIAGNOSIS — R23.8 FACIAL AGING: Primary | ICD-10-CM

## 2025-02-12 PROCEDURE — MISCOS20: Performed by: PLASTIC SURGERY

## 2025-02-12 PROCEDURE — DM00150 PR DERM ONLY BOTOX INJ LEVEL 6: Performed by: PLASTIC SURGERY

## 2025-02-13 NOTE — PROGRESS NOTES
Erbium YAG MicroLaserPeel / Profractionated Laser Procedure Note    Patient received a Combination MLP / ProFractional laser treatment today, performed by Dr. Cayden Street.     Laser safety protocols were followed.    Risks of laser therapy were explained prior to the procedure which include but are not limited to bleeding, pain (acute and chronic), scarring, hyopigmentation, hyperpigmentation, skin sensitivity that can be worsened by sun exposure. There is a risk of herpes or bacterial infection. The patient is educated to utilize sun protection for 3-4 months after the procedure, understands that there will be some pain involved during the procedure. The patient elects to proceed with the procedure.    BLT anesthetic combination was applied to the full face and allowed to work. The procedure was then carried out and the patient tolerated this well. The patient was sent home with Aquaphor placed over the face.     Post Treatment Instructions MicroLaserPeel / Profractionated Laser    Patient response can vary after a MLP treatment. Erythema (redness) and possibly edema (swelling) are there desired responses within a few minutes after the completion of the procedure. The degree of redness and length of healing time will increase with the depth of your peel.  Redness normally persists for 24 hours-5 days depending upon the depth of the peel.  Swelling is typically a short-term response.  Use of a cold compress or ice packs will help to relieve the swelling.  To avoid further swelling, you may choose to sleep in an upright position the first night after the treatment.  If an antiviral was prescribed for you, continue to take as directed.  Post treatment discomfort may be relieved by oral pain relievers; i.e. Extra Strength Tylenol.  A cold compress or an ice pack can be used to provide comfort if the treated area is especially warm.  This is typically only within the first 12 hours after the treatment.  Cleanse the skin

## 2025-03-10 ENCOUNTER — SCHEDULED TELEPHONE ENCOUNTER (OUTPATIENT)
Dept: SURGERY | Age: 65
End: 2025-03-10

## 2025-03-10 DIAGNOSIS — R23.8 FACIAL AGING: Primary | ICD-10-CM

## 2025-03-10 RX ORDER — OXYCODONE AND ACETAMINOPHEN 5; 325 MG/1; MG/1
1 TABLET ORAL EVERY 6 HOURS PRN
Qty: 12 TABLET | Refills: 0 | Status: SHIPPED | OUTPATIENT
Start: 2025-03-10 | End: 2025-03-13

## 2025-03-10 RX ORDER — DIAZEPAM 5 MG/1
5 TABLET ORAL EVERY 8 HOURS PRN
Qty: 3 TABLET | Refills: 0 | Status: SHIPPED | OUTPATIENT
Start: 2025-03-10 | End: 2025-03-20

## 2025-03-10 RX ORDER — VALACYCLOVIR HYDROCHLORIDE 1 G/1
1000 TABLET, FILM COATED ORAL DAILY
Qty: 5 TABLET | Refills: 0 | Status: SHIPPED | OUTPATIENT
Start: 2025-03-10 | End: 2025-03-15

## 2025-03-12 ENCOUNTER — OFFICE VISIT (OUTPATIENT)
Dept: SURGERY | Age: 65
End: 2025-03-12

## 2025-03-12 VITALS
SYSTOLIC BLOOD PRESSURE: 108 MMHG | DIASTOLIC BLOOD PRESSURE: 68 MMHG | OXYGEN SATURATION: 94 % | TEMPERATURE: 97.3 F | HEART RATE: 63 BPM

## 2025-03-12 DIAGNOSIS — R23.8 FACIAL AGING: Primary | ICD-10-CM

## 2025-03-12 PROCEDURE — MISCMLC: Performed by: PLASTIC SURGERY

## 2025-03-14 ENCOUNTER — TELEPHONE (OUTPATIENT)
Dept: SURGERY | Age: 65
End: 2025-03-14

## 2025-03-14 NOTE — TELEPHONE ENCOUNTER
Patient notified ok for pilates, if patient wants to use sunscreen, try small area on face first to see how skin responds, try to wait until redness has subsided, patient aware. Patient also notified about no cream for redness, apply aquaphor.

## 2025-03-14 NOTE — TELEPHONE ENCOUNTER
Patient called office states she is healing well and has quite a bit of redness, patient stated when she had co2 laser under her eyes, eyes became very red and a cream had to be ordered, name of cream unaware. Patient asking if  thinks she should have a cream ordered for redness to skin now. Also patient would like to wear sunscreen [facility], states no seepage or bleeding may she use sunscreen on face. Please advise.

## 2025-03-17 NOTE — TELEPHONE ENCOUNTER
Patient called office states now has red bumps cheek area and very itchy in same area as well as mouth area. Patient would like to use a cream, concerned aquaphor is irritating skin, patient also stated she is healing well. Please advise.

## 2025-03-19 ENCOUNTER — OFFICE VISIT (OUTPATIENT)
Dept: SURGERY | Age: 65
End: 2025-03-19

## 2025-03-19 VITALS — TEMPERATURE: 97.2 F | RESPIRATION RATE: 20 BRPM | HEART RATE: 71 BPM | OXYGEN SATURATION: 95 %

## 2025-03-19 DIAGNOSIS — R23.8 FACIAL AGING: Primary | ICD-10-CM

## 2025-03-19 PROCEDURE — DM00130 PR DERM ONLY BOTOX INJ LEVEL 4: Performed by: PLASTIC SURGERY

## 2025-03-19 NOTE — PROGRESS NOTES
Botox 6units/Cosmetic  Lot: [id number]  Exp: 2027/05    Bacteriostatic 0.9% Sodium Chloride  Lot: [id number]  Exp: [id number]  Ndc: 0814-3327-19        
for check or 3 months for re-injection.            Call office with concerns or signs of infection.        This document is generated, in part, by voice recognition software and thus  syntax and grammatical errors are possible.    Cayden Street MD  9:30 AM  3/19/2025

## 2025-04-10 ENCOUNTER — OFFICE VISIT (OUTPATIENT)
Dept: PRIMARY CARE CLINIC | Age: 65
End: 2025-04-10
Payer: COMMERCIAL

## 2025-04-10 VITALS
TEMPERATURE: 97.1 F | HEIGHT: 67 IN | OXYGEN SATURATION: 97 % | SYSTOLIC BLOOD PRESSURE: 98 MMHG | RESPIRATION RATE: 16 BRPM | HEART RATE: 63 BPM | BODY MASS INDEX: 26.21 KG/M2 | WEIGHT: 167 LBS | DIASTOLIC BLOOD PRESSURE: 68 MMHG

## 2025-04-10 DIAGNOSIS — Z00.00 ANNUAL PHYSICAL EXAM: Primary | ICD-10-CM

## 2025-04-10 DIAGNOSIS — Z78.0 POSTMENOPAUSAL: ICD-10-CM

## 2025-04-10 DIAGNOSIS — E78.00 PURE HYPERCHOLESTEROLEMIA: ICD-10-CM

## 2025-04-10 LAB
ALBUMIN: 4.7 G/DL (ref 3.5–5.2)
ALP BLD-CCNC: 94 U/L (ref 35–104)
ALT SERPL-CCNC: 19 U/L (ref 0–32)
ANION GAP SERPL CALCULATED.3IONS-SCNC: 11 MMOL/L (ref 7–16)
AST SERPL-CCNC: 27 U/L (ref 0–31)
BILIRUB SERPL-MCNC: 0.8 MG/DL (ref 0–1.2)
BUN BLDV-MCNC: 22 MG/DL (ref 6–23)
CALCIUM SERPL-MCNC: 9.9 MG/DL (ref 8.6–10.2)
CHLORIDE BLD-SCNC: 101 MMOL/L (ref 98–107)
CHOLESTEROL, TOTAL: 243 MG/DL
CO2: 26 MMOL/L (ref 22–29)
CREAT SERPL-MCNC: 0.8 MG/DL (ref 0.5–1)
GFR, ESTIMATED: 83 ML/MIN/1.73M2
GLUCOSE BLD-MCNC: 95 MG/DL (ref 74–99)
HCT VFR BLD CALC: 46.2 % (ref 34–48)
HDLC SERPL-MCNC: 61 MG/DL
HEMOGLOBIN: 15.1 G/DL (ref 11.5–15.5)
LDL CHOLESTEROL: 157 MG/DL
MCH RBC QN AUTO: 30.6 PG (ref 26–35)
MCHC RBC AUTO-ENTMCNC: 32.7 G/DL (ref 32–34.5)
MCV RBC AUTO: 93.5 FL (ref 80–99.9)
PDW BLD-RTO: 12 % (ref 11.5–15)
PLATELET # BLD: 232 K/UL (ref 130–450)
PMV BLD AUTO: 13 FL (ref 7–12)
POTASSIUM SERPL-SCNC: 4.4 MMOL/L (ref 3.5–5)
RBC # BLD: 4.94 M/UL (ref 3.5–5.5)
SODIUM BLD-SCNC: 138 MMOL/L (ref 132–146)
TOTAL PROTEIN: 7.8 G/DL (ref 6.4–8.3)
TRIGL SERPL-MCNC: 125 MG/DL
VLDLC SERPL CALC-MCNC: 25 MG/DL
WBC # BLD: 5.1 K/UL (ref 4.5–11.5)

## 2025-04-10 PROCEDURE — 36415 COLL VENOUS BLD VENIPUNCTURE: CPT | Performed by: FAMILY MEDICINE

## 2025-04-10 PROCEDURE — 99396 PREV VISIT EST AGE 40-64: CPT | Performed by: FAMILY MEDICINE

## 2025-04-10 SDOH — ECONOMIC STABILITY: FOOD INSECURITY: WITHIN THE PAST 12 MONTHS, YOU WORRIED THAT YOUR FOOD WOULD RUN OUT BEFORE YOU GOT MONEY TO BUY MORE.: NEVER TRUE

## 2025-04-10 SDOH — ECONOMIC STABILITY: FOOD INSECURITY: WITHIN THE PAST 12 MONTHS, THE FOOD YOU BOUGHT JUST DIDN'T LAST AND YOU DIDN'T HAVE MONEY TO GET MORE.: NEVER TRUE

## 2025-04-10 ASSESSMENT — PATIENT HEALTH QUESTIONNAIRE - PHQ9
2. FEELING DOWN, DEPRESSED OR HOPELESS: NOT AT ALL
2. FEELING DOWN, DEPRESSED OR HOPELESS: NOT AT ALL
SUM OF ALL RESPONSES TO PHQ QUESTIONS 1-9: 0
SUM OF ALL RESPONSES TO PHQ QUESTIONS 1-9: 0
1. LITTLE INTEREST OR PLEASURE IN DOING THINGS: NOT AT ALL
1. LITTLE INTEREST OR PLEASURE IN DOING THINGS: NOT AT ALL
SUM OF ALL RESPONSES TO PHQ QUESTIONS 1-9: 0
SUM OF ALL RESPONSES TO PHQ9 QUESTIONS 1 & 2: 0
SUM OF ALL RESPONSES TO PHQ QUESTIONS 1-9: 0

## 2025-04-10 NOTE — PROGRESS NOTES
SUBJECTIVE:    HPI: Dayanara Alvarado  Was seen here today for   Chief Complaint   Patient presents with    Annual Exam    .  She states they are dealing with no new medical issues.    Past Medical History:   Diagnosis Date    Colon polyp     Diverticulosis 09/29/2023    Pure hypercholesterolemia 04/10/2023    Ureter obstruction     age 16 L.       Past Surgical History:   Procedure Laterality Date    COLONOSCOPY  08/13/2013    COLONOSCOPY N/A 8/22/2018    COLONOSCOPY POLYPECTOMY SNARE/COLD BIOPSY performed by Domingo Madrid MD at [facility] ENDOSCOPY    COLONOSCOPY N/A 9/29/2023    COLORECTAL CANCER SCREENING, HIGH RISK performed by Domingo Madrid MD at [facility] ENDOSCOPY    URETER SURGERY      OBSTRUCTION  AGE 16       Family History   Problem Relation Age of Onset    High Cholesterol Mother     Cancer Mother 59        Lung cancer    Cancer Father         Prostate    High Cholesterol Brother     Cancer Brother         multiple myeloma    No Known Problems Brother        Prior to Admission medications    Medication Sig Start Date End Date Taking? Authorizing Provider   Multiple Vitamins-Minerals (THERAPEUTIC MULTIVITAMIN-MINERALS) tablet Take 1 tablet by mouth daily LD 8-17-18   Yes Provider, Ray, MD       Pcn [penicillins] and Shellfish-derived products    Social History     Tobacco Use    Smoking status: Never    Smokeless tobacco: Never   Substance Use Topics    Alcohol use: Yes     Comment: OCCASIONALLY         Review Of Systems:    Skin: no abnormal pigmentation, rash, scaling, itching, masses, hair or nail changes  Eyes: no blurring, diplopia, or eye pain  Ears/Nose/Throat: no hearing loss, tinnitus, vertigo, nosebleed, nasal congestion, rhinorrhea, sore throat  Respiratory: no cough, pleuritic chest pain, dyspnea, or wheezing  Cardiovascular: no chest pain, angina, dyspnea on exertion, orthopnea, PND, palpitations, or claudication  Gastrointestinal: no nausea, vomiting, heartburn, diarrhea, constipation,

## 2025-04-14 ENCOUNTER — RESULTS FOLLOW-UP (OUTPATIENT)
Dept: PRIMARY CARE CLINIC | Age: 65
End: 2025-04-14

## 2025-04-14 DIAGNOSIS — E78.00 PURE HYPERCHOLESTEROLEMIA: Primary | ICD-10-CM

## 2025-04-14 NOTE — TELEPHONE ENCOUNTER
Spoke with patient cholesterol still elevated but only 3.2% ASCVD risk.  She is starting to exercise.  We will check cholesterol in 3 months.  I did want to start her on a low cholesterol diet medication i.e. Crestor 5 mg even taking it 3-4 times a week but she wishes to hold off presently.  We will see how her labs are in 3 months.  Lab appointment only needed.

## 2025-04-18 DIAGNOSIS — Z78.0 POSTMENOPAUSAL: ICD-10-CM

## 2025-06-04 ENCOUNTER — OFFICE VISIT (OUTPATIENT)
Dept: SURGERY | Age: 65
End: 2025-06-04

## 2025-06-04 VITALS
RESPIRATION RATE: 18 BRPM | OXYGEN SATURATION: 97 % | TEMPERATURE: 97.3 F | SYSTOLIC BLOOD PRESSURE: 100 MMHG | HEART RATE: 64 BPM | DIASTOLIC BLOOD PRESSURE: 64 MMHG

## 2025-06-04 DIAGNOSIS — R23.8 FACIAL AGING: Primary | ICD-10-CM

## 2025-06-04 PROCEDURE — DM00130 PR DERM ONLY BOTOX INJ LEVEL 4: Performed by: PLASTIC SURGERY

## 2025-06-05 ENCOUNTER — TELEPHONE (OUTPATIENT)
Dept: PRIMARY CARE CLINIC | Age: 65
End: 2025-06-05

## 2025-06-05 NOTE — TELEPHONE ENCOUNTER
I addended my note and ordered the osteoporosis bone density other the diagnosis of osteoporosis screening.  Please have this resubmitted to see if it will be covered then.

## 2025-06-05 NOTE — TELEPHONE ENCOUNTER
Dayanara called and stated the bone density test that was ordered by you in April, was not coded correctly.     [facility] [place] told her to re-code it as preventative, or needing a baseline, and send it back.    If it was coded correctly, she would not have owed anything.      Please advise    Thank you

## 2025-06-18 NOTE — PROGRESS NOTES
Botox 52 units/cosmetic  Lot: [id number]  Exp: 2027/06    Bacteriostatic 0.9% Sodium Chloride  Lot: [id number]  Exp: 31 MAY 2026  Ndc: 1158-5913-02  
Botulinum Toxin Injection Procedure Note:      The risks, benefits and options were discussed with the pt. The risks included but not limited to pain, bleeding, infection, asymmetry,  and need for further procedures. The patient understands that there is a risk for upper eyelid ptosis. There may be a need for touch up injections and follow up at 2 weeks is encouraged. All of Her questions were answered to Her satisfaction and She agrees to proceed with the operation.    The forehead, glabella, crows feet, upper lip was cleansed with alcohol. Ice was used to numb the area. The different FDA approved brands of botulinum toxin A were discussed with the patient, Botox was agreed upon and reconstituted in a concentration of 1 unit/ 0.01cc with sterile injectable saline.  52 units were injected into the areas. There was pinpoint bleeding and local redness. The patient tolerated the procedure well.    The patient was counseled to use ice for the next hour and limit strenuous activity for 24 hours.    Follow up in 2 weeks for check or 3 months for re-injection.  The risks, benefits and options were discussed with the pt. The risks included but not limited to pain, bleeding, infection, heavy scarring, neurovascular injury, damage to surrounding structures, fluid collections, asymmetry, wound healing complications requiring dressing changes, heart attack, stroke, DVT, PE, contour irregularities, loss of work, death, and need for further procedures. No guarantees were given. All of Her questions were answered to Her satisfaction and She agrees to proceed with the operation.      This document is generated, in part, by voice recognition software and thus  syntax and grammatical errors are possible.    Cayden Street MD  1:37 PM  6/17/2025  '    
no

## 2025-07-01 ENCOUNTER — TELEPHONE (OUTPATIENT)
Dept: PRIMARY CARE CLINIC | Age: 65
End: 2025-07-01

## 2025-07-01 NOTE — TELEPHONE ENCOUNTER
Dayanara called and stated the bone density test that was ordered by you in April, was not coded correctly.      [facility] [place] told her to \"re-code it as preventative,\" or needing a baseline, and send it back.       Please code it as Preventative

## 2025-07-28 ENCOUNTER — TELEPHONE (OUTPATIENT)
Dept: PRIMARY CARE CLINIC | Age: 65
End: 2025-07-28

## 2025-09-03 LAB — MAMMOGRAPHY, EXTERNAL: NORMAL

## (undated) DEVICE — GRADUATE TRIANG MEASURE 1000ML BLK PRNT

## (undated) DEVICE — SPONGE GZ W4XL4IN RAYON POLY CVR W/NONWOVEN FAB STRL 2/PK

## (undated) DEVICE — PLATE ES AD W 9FT CRD 2

## (undated) DEVICE — TRAP POLYP ETRAP

## (undated) DEVICE — SPONGE GZ W4XL4IN RAYON POLY FILL CVR W/ NONWOVEN FAB

## (undated) DEVICE — SNARE ENDOSCP L240CM LOOP W13MM SHTH DIA2.4MM SM OVL FLX